# Patient Record
Sex: MALE | Race: WHITE | NOT HISPANIC OR LATINO | Employment: OTHER | URBAN - METROPOLITAN AREA
[De-identification: names, ages, dates, MRNs, and addresses within clinical notes are randomized per-mention and may not be internally consistent; named-entity substitution may affect disease eponyms.]

---

## 2022-01-24 ENCOUNTER — PREP FOR SURGERY (OUTPATIENT)
Dept: OTHER | Facility: HOSPITAL | Age: 72
End: 2022-01-24

## 2022-01-24 ENCOUNTER — CLINICAL SUPPORT (OUTPATIENT)
Dept: GASTROENTEROLOGY | Facility: CLINIC | Age: 72
End: 2022-01-24

## 2022-01-24 DIAGNOSIS — Z12.11 COLON CANCER SCREENING: Primary | ICD-10-CM

## 2022-01-24 RX ORDER — FUROSEMIDE 80 MG
80 TABLET ORAL 2 TIMES DAILY
COMMUNITY

## 2022-01-24 RX ORDER — ATORVASTATIN CALCIUM 40 MG/1
TABLET, FILM COATED ORAL
COMMUNITY
Start: 2021-10-20

## 2022-01-24 RX ORDER — INSULIN ASPART 100 [IU]/ML
25 INJECTION, SUSPENSION SUBCUTANEOUS 2 TIMES DAILY WITH MEALS
COMMUNITY

## 2022-01-24 RX ORDER — AMLODIPINE BESYLATE 10 MG/1
TABLET ORAL
COMMUNITY
Start: 2021-10-20

## 2022-01-24 RX ORDER — LISINOPRIL 40 MG/1
40 TABLET ORAL DAILY
COMMUNITY

## 2022-01-24 RX ORDER — ATENOLOL 50 MG/1
50 TABLET ORAL DAILY
COMMUNITY

## 2022-01-24 RX ORDER — METFORMIN HYDROCHLORIDE 500 MG/1
TABLET, EXTENDED RELEASE ORAL
COMMUNITY
Start: 2021-10-19

## 2022-01-24 RX ORDER — SPIRONOLACTONE 25 MG/1
TABLET ORAL
COMMUNITY
Start: 2021-11-18

## 2022-01-24 RX ORDER — EMPAGLIFLOZIN 25 MG/1
TABLET, FILM COATED ORAL
COMMUNITY
Start: 2021-10-21

## 2022-01-24 RX ORDER — SIMETHICONE 80 MG
TABLET,CHEWABLE ORAL
COMMUNITY
Start: 2021-10-21

## 2022-01-24 RX ORDER — TAMSULOSIN HYDROCHLORIDE 0.4 MG/1
1 CAPSULE ORAL DAILY
COMMUNITY

## 2022-01-24 RX ORDER — SENNOSIDES 8.6 MG
650 CAPSULE ORAL EVERY 8 HOURS PRN
COMMUNITY

## 2022-01-24 NOTE — PROGRESS NOTES
Jack Checocarlos Parrish     REASON FOR CALL-colonoscopy, colon screening, last colon 2016    SENT IN "[facility] [employer] [facility]  DOS-03/28/2022  History reviewed. No pertinent past medical history.  No Known Allergies  Past Surgical History:   Procedure Laterality Date   • COLONOSCOPY  2017   • TONSILLECTOMY       Social History     Socioeconomic History   • Marital status:    Tobacco Use   • Smoking status: Current Every Day Smoker     Types: Cigars   • Smokeless tobacco: Former User   Vaping Use   • Vaping Use: Never used   Substance and Sexual Activity   • Alcohol use: Not Currently   • Drug use: Defer   • Sexual activity: Defer     History reviewed. No pertinent family history.    Current Outpatient Medications:   •  acetaminophen (TYLENOL) 650 MG 8 hr tablet, Take 650 mg by mouth Every 8 (Eight) Hours As Needed for Mild Pain ., Disp: , Rfl:   •  amLODIPine (NORVASC) 10 MG tablet, , Disp: , Rfl:   •  atenolol (TENORMIN) 50 MG tablet, Take 50 mg by mouth Daily., Disp: , Rfl:   •  furosemide (LASIX) 80 MG tablet, Take 80 mg by mouth 2 (Two) Times a Day., Disp: , Rfl:   •  insulin aspart prot-insulin aspart (novoLOG 70/30) (70-30) 100 UNIT/ML injection, Inject 25 Units under the skin into the appropriate area as directed 2 (Two) Times a Day With Meals., Disp: , Rfl:   •  Jardiance 25 MG tablet tablet, , Disp: , Rfl:   •  lisinopril (PRINIVIL,ZESTRIL) 40 MG tablet, Take 40 mg by mouth Daily., Disp: , Rfl:   •  metFORMIN ER (GLUCOPHAGE-XR) 500 MG 24 hr tablet, , Disp: , Rfl:   •  simethicone (MYLICON) 80 MG chewable tablet, , Disp: , Rfl:   •  spironolactone (ALDACTONE) 25 MG tablet, , Disp: , Rfl:   •  tamsulosin (FLOMAX) 0.4 MG capsule 24 hr capsule, Take 1 capsule by mouth Daily., Disp: , Rfl:   •  vitamin E 100 UNIT capsule, Take 100 Units by mouth Daily., Disp: , Rfl:   •  atorvastatin (LIPITOR) 40 MG tablet, , Disp: , Rfl:

## 2022-03-28 ENCOUNTER — HOSPITAL ENCOUNTER (OUTPATIENT)
Facility: HOSPITAL | Age: 72
Setting detail: HOSPITAL OUTPATIENT SURGERY
Discharge: HOME | End: 2022-03-28
Attending: INTERNAL MEDICINE | Admitting: INTERNAL MEDICINE

## 2022-03-28 ENCOUNTER — ANESTHESIA EVENT (OUTPATIENT)
Dept: GASTROENTEROLOGY | Facility: HOSPITAL | Age: 72
End: 2022-03-28

## 2022-03-28 ENCOUNTER — ANESTHESIA (OUTPATIENT)
Dept: GASTROENTEROLOGY | Facility: HOSPITAL | Age: 72
End: 2022-03-28

## 2022-03-28 VITALS
OXYGEN SATURATION: 93 % | WEIGHT: 303.35 LBS | DIASTOLIC BLOOD PRESSURE: 82 MMHG | SYSTOLIC BLOOD PRESSURE: 138 MMHG | HEART RATE: 72 BPM | RESPIRATION RATE: 20 BRPM | TEMPERATURE: 98 F

## 2022-03-28 DIAGNOSIS — Z12.11 COLON CANCER SCREENING: ICD-10-CM

## 2022-03-28 LAB — GLUCOSE BLDC GLUCOMTR-MCNC: 97 MG/DL (ref 70–99)

## 2022-03-28 PROCEDURE — 25010000002 PROPOFOL 10 MG/ML EMULSION: Performed by: NURSE ANESTHETIST, CERTIFIED REGISTERED

## 2022-03-28 PROCEDURE — 88305 TISSUE EXAM BY PATHOLOGIST: CPT | Performed by: INTERNAL MEDICINE

## 2022-03-28 PROCEDURE — 45385 COLONOSCOPY W/LESION REMOVAL: CPT | Performed by: INTERNAL MEDICINE

## 2022-03-28 PROCEDURE — 82962 GLUCOSE BLOOD TEST: CPT

## 2022-03-28 RX ORDER — SODIUM CHLORIDE, SODIUM LACTATE, POTASSIUM CHLORIDE, CALCIUM CHLORIDE 600; 310; 30; 20 MG/100ML; MG/100ML; MG/100ML; MG/100ML
30 INJECTION, SOLUTION INTRAVENOUS CONTINUOUS
Status: DISCONTINUED | OUTPATIENT
Start: 2022-03-28 | End: 2022-03-28 | Stop reason: HOSPADM

## 2022-03-28 RX ORDER — LIDOCAINE HYDROCHLORIDE 20 MG/ML
INJECTION, SOLUTION INFILTRATION; PERINEURAL AS NEEDED
Status: DISCONTINUED | OUTPATIENT
Start: 2022-03-28 | End: 2022-03-28 | Stop reason: SURG

## 2022-03-28 RX ORDER — PROPOFOL 10 MG/ML
VIAL (ML) INTRAVENOUS AS NEEDED
Status: DISCONTINUED | OUTPATIENT
Start: 2022-03-28 | End: 2022-03-28 | Stop reason: SURG

## 2022-03-28 RX ADMIN — SODIUM CHLORIDE, POTASSIUM CHLORIDE, SODIUM LACTATE AND CALCIUM CHLORIDE 30 ML/HR: 600; 310; 30; 20 INJECTION, SOLUTION INTRAVENOUS at 12:32

## 2022-03-28 RX ADMIN — LIDOCAINE HYDROCHLORIDE 100 MG: 20 INJECTION, SOLUTION INFILTRATION; PERINEURAL at 12:58

## 2022-03-28 RX ADMIN — PROPOFOL 50 MG: 10 INJECTION, EMULSION INTRAVENOUS at 12:58

## 2022-03-28 RX ADMIN — PROPOFOL 200 MCG/KG/MIN: 10 INJECTION, EMULSION INTRAVENOUS at 12:58

## 2022-03-28 NOTE — H&P
ScreeningPre Procedure History & Physical    Chief Complaint:   Screening     Subjective     HPI:   Screening     Past Medical History:   No past medical history on file.    Past Surgical History:  Past Surgical History:   Procedure Laterality Date   • COLONOSCOPY  2017   • TONSILLECTOMY         Family History:  No family history on file.    Social History:   reports that he has been smoking cigars. He has quit using smokeless tobacco. He reports previous alcohol use. Drug use questions deferred to the physician.    Medications:   Medications Prior to Admission   Medication Sig Dispense Refill Last Dose   • acetaminophen (TYLENOL) 650 MG 8 hr tablet Take 650 mg by mouth Every 8 (Eight) Hours As Needed for Mild Pain .   Past Week at Unknown time   • atorvastatin (LIPITOR) 40 MG tablet    Past Week at Unknown time   • simethicone (MYLICON) 80 MG chewable tablet    Past Week at Unknown time   • vitamin E 100 UNIT capsule Take 100 Units by mouth Daily.   Past Week at Unknown time   • amLODIPine (NORVASC) 10 MG tablet    3/25/2022   • atenolol (TENORMIN) 50 MG tablet Take 50 mg by mouth Daily.   3/25/2022   • furosemide (LASIX) 80 MG tablet Take 80 mg by mouth 2 (Two) Times a Day.   3/25/2022   • insulin aspart prot-insulin aspart (novoLOG 70/30) (70-30) 100 UNIT/ML injection Inject 25 Units under the skin into the appropriate area as directed 2 (Two) Times a Day With Meals.   3/25/2022   • Jardiance 25 MG tablet tablet    3/25/2022   • lisinopril (PRINIVIL,ZESTRIL) 40 MG tablet Take 40 mg by mouth Daily.   3/25/2022   • metFORMIN ER (GLUCOPHAGE-XR) 500 MG 24 hr tablet    3/25/2022   • spironolactone (ALDACTONE) 25 MG tablet    3/25/2022   • tamsulosin (FLOMAX) 0.4 MG capsule 24 hr capsule Take 1 capsule by mouth Daily.   3/25/2022       Allergies:  Patient has no known allergies.        Objective     Blood pressure 155/75, pulse 77, temperature 98.3 °F (36.8 °C), temperature source Temporal, resp. rate 16, weight (!)  138 kg (303 lb 5.7 oz), SpO2 95 %.    Physical Exam   Constitutional: Pt is oriented to person, place, and time and well-developed, well-nourished, and in no distress.   Mouth/Throat: Oropharynx is clear and moist.   Neck: Normal range of motion.   Cardiovascular: Normal rate, regular rhythm and normal heart sounds.    Pulmonary/Chest: Effort normal and breath sounds normal.   Abdominal: Soft. Nontender  Skin: Skin is warm and dry.   Psychiatric: Mood, memory, affect and judgment normal.     Assessment/Plan     Diagnosis:  Screening colonoscopy       Anticipated Surgical Procedure:  colonoscopy    The risks, benefits, and alternatives of this procedure have been discussed with the patient or the responsible party- the patient understands and agrees to proceed.

## 2022-03-28 NOTE — ANESTHESIA PREPROCEDURE EVALUATION
Anesthesia Evaluation     Patient summary reviewed and Nursing notes reviewed   NPO Solid Status: > 6 hours  NPO Liquid Status: > 6 hours           Airway   Mallampati: II  TM distance: >3 FB  Dental      Pulmonary - negative pulmonary ROS and normal exam   Cardiovascular - negative cardio ROS and normal exam  Exercise tolerance: good (4-7 METS)        Neuro/Psych  GI/Hepatic/Renal/Endo    (+) morbid obesity,      Musculoskeletal     Abdominal    Substance History      OB/GYN          Other                        Anesthesia Plan    ASA 3     general     intravenous induction     Anesthetic plan, all risks, benefits, and alternatives have been provided, discussed and informed consent has been obtained with: patient.        CODE STATUS:

## 2022-03-28 NOTE — ANESTHESIA POSTPROCEDURE EVALUATION
Patient: Avila Parrish    Procedure Summary     Date: 03/28/22 Room / Location: [facility] ENDOSCOPY 2 / [facility] ENDOSCOPY    Anesthesia Start: 1254 Anesthesia Stop: 1324    Procedure: COLONOSCOPY WITH POLYPECTOMIES (N/A ) Diagnosis:       Colon cancer screening      (Colon cancer screening [Z12.11])    Surgeons: Tristan Cooper MD Provider: Danica Terrazas MD    Anesthesia Type: general ASA Status: 3          Anesthesia Type: general    Vitals  Vitals Value Taken Time   /72 03/28/22 1334   Temp 36.7 °C (98 °F) 03/28/22 1323   Pulse 79 03/28/22 1335   Resp 22 03/28/22 1330   SpO2 93 % 03/28/22 1335   Vitals shown include unvalidated device data.        Post Anesthesia Care and Evaluation    Patient location during evaluation: bedside  Patient participation: complete - patient participated  Level of consciousness: awake  Pain score: 0  Pain management: adequate  Airway patency: patent  Anesthetic complications: No anesthetic complications  PONV Status: none  Cardiovascular status: acceptable and stable  Respiratory status: acceptable and room air  Hydration status: acceptable    Comments: An Anesthesiologist personally participated in the most demanding procedures (including induction and emergence if applicable) in the anesthesia plan, monitored the course of anesthesia administration at frequent intervals and remained physically present and available for immediate diagnosis and treatment of emergencies.

## 2022-03-30 ENCOUNTER — TELEPHONE (OUTPATIENT)
Dept: GASTROENTEROLOGY | Facility: CLINIC | Age: 72
End: 2022-03-30

## 2022-03-30 LAB
CYTO UR: NORMAL
LAB AP CASE REPORT: NORMAL
LAB AP CLINICAL INFORMATION: NORMAL
PATH REPORT.FINAL DX SPEC: NORMAL
PATH REPORT.GROSS SPEC: NORMAL

## 2022-03-30 NOTE — TELEPHONE ENCOUNTER
Pt. Placed in recall----- Message from TRAVIS Betancourt sent at 3/30/2022 11:08 AM EDT -----  I have reviewed the patient colonoscopy and pathology report. Patient has tubular adenoma polyps on pathology report that are smaller than 10mm in size. It is recommended the patient be on a 5 year recall. Please place in the recall system.

## 2025-02-12 ENCOUNTER — APPOINTMENT (OUTPATIENT)
Dept: CT IMAGING | Facility: HOSPITAL | Age: 75
DRG: 291 | End: 2025-02-12
Payer: OTHER GOVERNMENT

## 2025-02-12 ENCOUNTER — HOSPITAL ENCOUNTER (INPATIENT)
Facility: HOSPITAL | Age: 75
LOS: 6 days | Discharge: HOME HEALTH | DRG: 291 | End: 2025-02-18
Attending: EMERGENCY MEDICINE | Admitting: STUDENT IN AN ORGANIZED HEALTH CARE EDUCATION/TRAINING PROGRAM
Payer: OTHER GOVERNMENT

## 2025-02-12 ENCOUNTER — APPOINTMENT (OUTPATIENT)
Dept: GENERAL RADIOLOGY | Facility: HOSPITAL | Age: 75
DRG: 291 | End: 2025-02-12
Payer: OTHER GOVERNMENT

## 2025-02-12 DIAGNOSIS — Z78.9 DECREASED ACTIVITIES OF DAILY LIVING (ADL): ICD-10-CM

## 2025-02-12 DIAGNOSIS — J90 PLEURAL EFFUSION: ICD-10-CM

## 2025-02-12 DIAGNOSIS — R26.2 DIFFICULTY IN WALKING: ICD-10-CM

## 2025-02-12 DIAGNOSIS — R51.9 ACUTE INTRACTABLE HEADACHE, UNSPECIFIED HEADACHE TYPE: ICD-10-CM

## 2025-02-12 DIAGNOSIS — I63.9 CEREBROVASCULAR ACCIDENT (CVA), UNSPECIFIED MECHANISM: Primary | ICD-10-CM

## 2025-02-12 DIAGNOSIS — J90 PLEURAL EFFUSION, RIGHT: ICD-10-CM

## 2025-02-12 PROBLEM — E78.5 HYPERLIPIDEMIA: Status: ACTIVE | Noted: 2025-02-12

## 2025-02-12 PROBLEM — E11.65 TYPE 2 DIABETES MELLITUS WITH HYPERGLYCEMIA: Status: ACTIVE | Noted: 2025-02-12

## 2025-02-12 PROBLEM — I50.21 SYSTOLIC CHF, ACUTE: Status: ACTIVE | Noted: 2025-02-12

## 2025-02-12 PROBLEM — I10 ESSENTIAL HYPERTENSION: Status: ACTIVE | Noted: 2025-02-12

## 2025-02-12 PROBLEM — I50.21 SYSTOLIC CHF, ACUTE: Status: RESOLVED | Noted: 2025-02-12 | Resolved: 2025-02-12

## 2025-02-12 LAB
ABO GROUP BLD: NORMAL
ABO GROUP BLD: NORMAL
ALBUMIN SERPL-MCNC: 3.5 G/DL (ref 3.5–5.2)
ALBUMIN/GLOB SERPL: 1.1 G/DL
ALP SERPL-CCNC: 66 U/L (ref 39–117)
ALT SERPL W P-5'-P-CCNC: 6 U/L (ref 1–41)
ANION GAP SERPL CALCULATED.3IONS-SCNC: 13.1 MMOL/L (ref 5–15)
APTT PPP: 34.1 SECONDS (ref 24.2–34.2)
AST SERPL-CCNC: 16 U/L (ref 1–40)
BASOPHILS # BLD AUTO: 0.02 10*3/MM3 (ref 0–0.2)
BASOPHILS NFR BLD AUTO: 0.3 % (ref 0–1.5)
BILIRUB SERPL-MCNC: 0.7 MG/DL (ref 0–1.2)
BLD GP AB SCN SERPL QL: NEGATIVE
BUN SERPL-MCNC: 15 MG/DL (ref 8–23)
BUN/CREAT SERPL: 7.7 (ref 7–25)
CALCIUM SPEC-SCNC: 9.5 MG/DL (ref 8.6–10.5)
CHLORIDE SERPL-SCNC: 102 MMOL/L (ref 98–107)
CO2 SERPL-SCNC: 22.9 MMOL/L (ref 22–29)
CREAT SERPL-MCNC: 1.95 MG/DL (ref 0.76–1.27)
DEPRECATED RDW RBC AUTO: 48.7 FL (ref 37–54)
EGFRCR SERPLBLD CKD-EPI 2021: 35 ML/MIN/1.73
EOSINOPHIL # BLD AUTO: 0 10*3/MM3 (ref 0–0.4)
EOSINOPHIL NFR BLD AUTO: 0 % (ref 0.3–6.2)
ERYTHROCYTE [DISTWIDTH] IN BLOOD BY AUTOMATED COUNT: 15.5 % (ref 12.3–15.4)
GLOBULIN UR ELPH-MCNC: 3.1 GM/DL
GLUCOSE SERPL-MCNC: 150 MG/DL (ref 65–99)
HCT VFR BLD AUTO: 45.3 % (ref 37.5–51)
HGB BLD-MCNC: 14.8 G/DL (ref 13–17.7)
HOLD SPECIMEN: NORMAL
HOLD SPECIMEN: NORMAL
IMM GRANULOCYTES # BLD AUTO: 0.06 10*3/MM3 (ref 0–0.05)
IMM GRANULOCYTES NFR BLD AUTO: 0.9 % (ref 0–0.5)
INR PPP: 1.27 (ref 0.86–1.15)
LYMPHOCYTES # BLD AUTO: 0.71 10*3/MM3 (ref 0.7–3.1)
LYMPHOCYTES NFR BLD AUTO: 10.4 % (ref 19.6–45.3)
MCH RBC QN AUTO: 28.4 PG (ref 26.6–33)
MCHC RBC AUTO-ENTMCNC: 32.7 G/DL (ref 31.5–35.7)
MCV RBC AUTO: 86.8 FL (ref 79–97)
MONOCYTES # BLD AUTO: 0.63 10*3/MM3 (ref 0.1–0.9)
MONOCYTES NFR BLD AUTO: 9.2 % (ref 5–12)
NEUTROPHILS NFR BLD AUTO: 5.42 10*3/MM3 (ref 1.7–7)
NEUTROPHILS NFR BLD AUTO: 79.2 % (ref 42.7–76)
NRBC BLD AUTO-RTO: 0 /100 WBC (ref 0–0.2)
PLATELET # BLD AUTO: 130 10*3/MM3 (ref 140–450)
PMV BLD AUTO: 11.7 FL (ref 6–12)
POTASSIUM SERPL-SCNC: 4 MMOL/L (ref 3.5–5.2)
PROT SERPL-MCNC: 6.6 G/DL (ref 6–8.5)
PROTHROMBIN TIME: 16.4 SECONDS (ref 11.8–14.9)
RBC # BLD AUTO: 5.22 10*6/MM3 (ref 4.14–5.8)
RH BLD: NEGATIVE
RH BLD: NEGATIVE
SODIUM SERPL-SCNC: 138 MMOL/L (ref 136–145)
T&S EXPIRATION DATE: NORMAL
WBC NRBC COR # BLD AUTO: 6.84 10*3/MM3 (ref 3.4–10.8)
WHOLE BLOOD HOLD COAG: NORMAL
WHOLE BLOOD HOLD SPECIMEN: NORMAL

## 2025-02-12 PROCEDURE — 82948 REAGENT STRIP/BLOOD GLUCOSE: CPT

## 2025-02-12 PROCEDURE — 99222 1ST HOSP IP/OBS MODERATE 55: CPT | Performed by: PSYCHIATRY & NEUROLOGY

## 2025-02-12 PROCEDURE — 85730 THROMBOPLASTIN TIME PARTIAL: CPT | Performed by: EMERGENCY MEDICINE

## 2025-02-12 PROCEDURE — 99223 1ST HOSP IP/OBS HIGH 75: CPT | Performed by: STUDENT IN AN ORGANIZED HEALTH CARE EDUCATION/TRAINING PROGRAM

## 2025-02-12 PROCEDURE — 99285 EMERGENCY DEPT VISIT HI MDM: CPT

## 2025-02-12 PROCEDURE — 86900 BLOOD TYPING SEROLOGIC ABO: CPT

## 2025-02-12 PROCEDURE — 86850 RBC ANTIBODY SCREEN: CPT | Performed by: EMERGENCY MEDICINE

## 2025-02-12 PROCEDURE — 71045 X-RAY EXAM CHEST 1 VIEW: CPT

## 2025-02-12 PROCEDURE — 36415 COLL VENOUS BLD VENIPUNCTURE: CPT

## 2025-02-12 PROCEDURE — 93010 ELECTROCARDIOGRAM REPORT: CPT | Performed by: INTERNAL MEDICINE

## 2025-02-12 PROCEDURE — 25510000001 IOPAMIDOL PER 1 ML: Performed by: EMERGENCY MEDICINE

## 2025-02-12 PROCEDURE — 25010000002 DIPHENHYDRAMINE PER 50 MG: Performed by: EMERGENCY MEDICINE

## 2025-02-12 PROCEDURE — 0042T HC CT CEREBRAL PERFUSION W/WO CONTRAST: CPT

## 2025-02-12 PROCEDURE — 70496 CT ANGIOGRAPHY HEAD: CPT

## 2025-02-12 PROCEDURE — 85025 COMPLETE CBC W/AUTO DIFF WBC: CPT | Performed by: EMERGENCY MEDICINE

## 2025-02-12 PROCEDURE — 36415 COLL VENOUS BLD VENIPUNCTURE: CPT | Performed by: EMERGENCY MEDICINE

## 2025-02-12 PROCEDURE — 70498 CT ANGIOGRAPHY NECK: CPT

## 2025-02-12 PROCEDURE — 80053 COMPREHEN METABOLIC PANEL: CPT | Performed by: EMERGENCY MEDICINE

## 2025-02-12 PROCEDURE — 25010000002 METOCLOPRAMIDE PER 10 MG: Performed by: EMERGENCY MEDICINE

## 2025-02-12 PROCEDURE — 86901 BLOOD TYPING SEROLOGIC RH(D): CPT | Performed by: EMERGENCY MEDICINE

## 2025-02-12 PROCEDURE — 86901 BLOOD TYPING SEROLOGIC RH(D): CPT

## 2025-02-12 PROCEDURE — 93005 ELECTROCARDIOGRAM TRACING: CPT | Performed by: EMERGENCY MEDICINE

## 2025-02-12 PROCEDURE — 25010000002 KETOROLAC TROMETHAMINE PER 15 MG: Performed by: EMERGENCY MEDICINE

## 2025-02-12 PROCEDURE — 70450 CT HEAD/BRAIN W/O DYE: CPT

## 2025-02-12 PROCEDURE — 85610 PROTHROMBIN TIME: CPT | Performed by: EMERGENCY MEDICINE

## 2025-02-12 PROCEDURE — 86900 BLOOD TYPING SEROLOGIC ABO: CPT | Performed by: EMERGENCY MEDICINE

## 2025-02-12 RX ORDER — BUMETANIDE 1 MG/1
1 TABLET ORAL 2 TIMES DAILY
COMMUNITY

## 2025-02-12 RX ORDER — IOPAMIDOL 755 MG/ML
100 INJECTION, SOLUTION INTRAVASCULAR
Status: COMPLETED | OUTPATIENT
Start: 2025-02-12 | End: 2025-02-12

## 2025-02-12 RX ORDER — TROSPIUM CHLORIDE 20 MG/1
20 TABLET, FILM COATED ORAL 2 TIMES DAILY
COMMUNITY

## 2025-02-12 RX ORDER — BACLOFEN 10 MG/1
5 TABLET ORAL NIGHTLY
COMMUNITY
End: 2025-02-18 | Stop reason: HOSPADM

## 2025-02-12 RX ORDER — METOPROLOL TARTRATE 100 MG/1
100 TABLET ORAL 2 TIMES DAILY
COMMUNITY
End: 2025-02-18 | Stop reason: HOSPADM

## 2025-02-12 RX ORDER — SODIUM CHLORIDE 0.9 % (FLUSH) 0.9 %
10 SYRINGE (ML) INJECTION AS NEEDED
Status: DISCONTINUED | OUTPATIENT
Start: 2025-02-12 | End: 2025-02-18 | Stop reason: HOSPADM

## 2025-02-12 RX ORDER — FINASTERIDE 5 MG/1
5 TABLET, FILM COATED ORAL DAILY
COMMUNITY

## 2025-02-12 RX ORDER — KETOROLAC TROMETHAMINE 15 MG/ML
15 INJECTION, SOLUTION INTRAMUSCULAR; INTRAVENOUS ONCE
Status: COMPLETED | OUTPATIENT
Start: 2025-02-12 | End: 2025-02-12

## 2025-02-12 RX ORDER — HYDRALAZINE HYDROCHLORIDE 25 MG/1
25 TABLET, FILM COATED ORAL 3 TIMES DAILY
COMMUNITY
End: 2025-02-18 | Stop reason: HOSPADM

## 2025-02-12 RX ORDER — IOPAMIDOL 755 MG/ML
100 INJECTION, SOLUTION INTRAVASCULAR
Status: DISCONTINUED | OUTPATIENT
Start: 2025-02-12 | End: 2025-02-18 | Stop reason: HOSPADM

## 2025-02-12 RX ORDER — MUPIROCIN 20 MG/G
1 OINTMENT TOPICAL 3 TIMES DAILY
COMMUNITY

## 2025-02-12 RX ORDER — POTASSIUM CHLORIDE 750 MG/1
10 TABLET, EXTENDED RELEASE ORAL DAILY
COMMUNITY

## 2025-02-12 RX ORDER — DIPHENHYDRAMINE HYDROCHLORIDE 50 MG/ML
25 INJECTION INTRAMUSCULAR; INTRAVENOUS ONCE
Status: COMPLETED | OUTPATIENT
Start: 2025-02-12 | End: 2025-02-12

## 2025-02-12 RX ORDER — LATANOPROST 50 UG/ML
1 SOLUTION/ DROPS OPHTHALMIC NIGHTLY
COMMUNITY

## 2025-02-12 RX ORDER — CHOLECALCIFEROL (VITAMIN D3) 25 MCG
1000 TABLET ORAL DAILY
COMMUNITY

## 2025-02-12 RX ORDER — METOCLOPRAMIDE HYDROCHLORIDE 5 MG/ML
10 INJECTION INTRAMUSCULAR; INTRAVENOUS ONCE
Status: COMPLETED | OUTPATIENT
Start: 2025-02-12 | End: 2025-02-12

## 2025-02-12 RX ADMIN — DIPHENHYDRAMINE HYDROCHLORIDE 25 MG: 50 INJECTION, SOLUTION INTRAMUSCULAR; INTRAVENOUS at 20:41

## 2025-02-12 RX ADMIN — IOPAMIDOL 40 ML: 755 INJECTION, SOLUTION INTRAVENOUS at 19:56

## 2025-02-12 RX ADMIN — KETOROLAC TROMETHAMINE 15 MG: 15 INJECTION, SOLUTION INTRAMUSCULAR; INTRAVENOUS at 20:40

## 2025-02-12 RX ADMIN — IOPAMIDOL 100 ML: 755 INJECTION, SOLUTION INTRAVENOUS at 20:02

## 2025-02-12 RX ADMIN — METOCLOPRAMIDE 10 MG: 5 INJECTION, SOLUTION INTRAMUSCULAR; INTRAVENOUS at 20:41

## 2025-02-13 ENCOUNTER — APPOINTMENT (OUTPATIENT)
Dept: CARDIOLOGY | Facility: HOSPITAL | Age: 75
DRG: 291 | End: 2025-02-13
Payer: OTHER GOVERNMENT

## 2025-02-13 ENCOUNTER — APPOINTMENT (OUTPATIENT)
Dept: MRI IMAGING | Facility: HOSPITAL | Age: 75
DRG: 291 | End: 2025-02-13
Payer: OTHER GOVERNMENT

## 2025-02-13 LAB
AV MEAN PRESS GRAD SYS DOP V1V2: 13 MMHG
AV VMAX SYS DOP: 219 CM/SEC
BACTERIA UR QL AUTO: ABNORMAL /HPF
BH CV ECHO MEAS - AO MAX PG: 19.2 MMHG
BH CV ECHO MEAS - AO ROOT DIAM: 2.2 CM
BH CV ECHO MEAS - AO V2 VTI: 37.6 CM
BH CV ECHO MEAS - AVA(I,D): 1.46 CM2
BH CV ECHO MEAS - EDV(CUBED): 227 ML
BH CV ECHO MEAS - EDV(MOD-SP2): 65.3 ML
BH CV ECHO MEAS - EDV(MOD-SP4): 78.1 ML
BH CV ECHO MEAS - EF(MOD-SP2): 51.8 %
BH CV ECHO MEAS - EF(MOD-SP4): 51.3 %
BH CV ECHO MEAS - ESV(CUBED): 91.1 ML
BH CV ECHO MEAS - ESV(MOD-SP2): 31.5 ML
BH CV ECHO MEAS - ESV(MOD-SP4): 38 ML
BH CV ECHO MEAS - FS: 26.2 %
BH CV ECHO MEAS - IVS/LVPW: 1 CM
BH CV ECHO MEAS - IVSD: 1.2 CM
BH CV ECHO MEAS - LA DIMENSION: 4.8 CM
BH CV ECHO MEAS - LAT PEAK E' VEL: 6.1 CM/SEC
BH CV ECHO MEAS - LV MASS(C)D: 322.7 GRAMS
BH CV ECHO MEAS - LV MAX PG: 5.9 MMHG
BH CV ECHO MEAS - LV MEAN PG: 3 MMHG
BH CV ECHO MEAS - LV V1 MAX: 121 CM/SEC
BH CV ECHO MEAS - LV V1 VTI: 19.3 CM
BH CV ECHO MEAS - LVIDD: 6.1 CM
BH CV ECHO MEAS - LVIDS: 4.5 CM
BH CV ECHO MEAS - LVOT AREA: 2.8 CM2
BH CV ECHO MEAS - LVOT DIAM: 1.9 CM
BH CV ECHO MEAS - LVPWD: 1.2 CM
BH CV ECHO MEAS - MED PEAK E' VEL: 5.3 CM/SEC
BH CV ECHO MEAS - MV DEC SLOPE: 673.8 CM/SEC2
BH CV ECHO MEAS - MV DEC TIME: 0.23 SEC
BH CV ECHO MEAS - MV E MAX VEL: 154 CM/SEC
BH CV ECHO MEAS - MV MAX PG: 9.5 MMHG
BH CV ECHO MEAS - MV MEAN PG: 5 MMHG
BH CV ECHO MEAS - MV P1/2T: 68.7 MSEC
BH CV ECHO MEAS - MV V2 VTI: 30.8 CM
BH CV ECHO MEAS - MVA(P1/2T): 3.2 CM2
BH CV ECHO MEAS - MVA(VTI): 1.78 CM2
BH CV ECHO MEAS - PA V2 MAX: 85.8 CM/SEC
BH CV ECHO MEAS - SV(LVOT): 54.7 ML
BH CV ECHO MEAS - SV(MOD-SP2): 33.8 ML
BH CV ECHO MEAS - SV(MOD-SP4): 40.1 ML
BH CV ECHO MEAS - TAPSE (>1.6): 1.75 CM
BH CV ECHO MEASUREMENTS AVERAGE E/E' RATIO: 27.02
BH CV XLRA - RV BASE: 3.8 CM
BH CV XLRA - RV MID: 3.1 CM
BH CV XLRA - TDI S': 11 CM/SEC
BILIRUB UR QL STRIP: NEGATIVE
CHOLEST SERPL-MCNC: 172 MG/DL (ref 0–200)
CLARITY UR: CLEAR
COLOR UR: YELLOW
GLUCOSE BLDC GLUCOMTR-MCNC: 130 MG/DL (ref 70–99)
GLUCOSE BLDC GLUCOMTR-MCNC: 164 MG/DL (ref 70–99)
GLUCOSE BLDC GLUCOMTR-MCNC: 180 MG/DL (ref 70–99)
GLUCOSE BLDC GLUCOMTR-MCNC: 188 MG/DL (ref 70–99)
GLUCOSE BLDC GLUCOMTR-MCNC: 194 MG/DL (ref 70–99)
GLUCOSE BLDC GLUCOMTR-MCNC: 207 MG/DL (ref 70–99)
GLUCOSE UR STRIP-MCNC: ABNORMAL MG/DL
HBA1C MFR BLD: 6.9 % (ref 4.8–5.6)
HDLC SERPL-MCNC: 35 MG/DL (ref 40–60)
HGB UR QL STRIP.AUTO: ABNORMAL
HYALINE CASTS UR QL AUTO: ABNORMAL /LPF
KETONES UR QL STRIP: ABNORMAL
LDLC SERPL CALC-MCNC: 106 MG/DL (ref 0–100)
LDLC/HDLC SERPL: 2.89 {RATIO}
LEFT ATRIUM VOLUME INDEX: 23.3 ML/M2
LEUKOCYTE ESTERASE UR QL STRIP.AUTO: NEGATIVE
LV EF BIPLANE MOD: 51 %
NITRITE UR QL STRIP: NEGATIVE
PH UR STRIP.AUTO: 6 [PH] (ref 5–8)
PROT UR QL STRIP: ABNORMAL
RBC # UR STRIP: ABNORMAL /HPF
REF LAB TEST METHOD: ABNORMAL
SP GR UR STRIP: >1.03 (ref 1–1.03)
SQUAMOUS #/AREA URNS HPF: ABNORMAL /HPF
TRIGL SERPL-MCNC: 179 MG/DL (ref 0–150)
UROBILINOGEN UR QL STRIP: ABNORMAL
VLDLC SERPL-MCNC: 31 MG/DL (ref 5–40)
WBC # UR STRIP: ABNORMAL /HPF
WHOLE BLOOD HOLD SPECIMEN: NORMAL

## 2025-02-13 PROCEDURE — 97166 OT EVAL MOD COMPLEX 45 MIN: CPT

## 2025-02-13 PROCEDURE — 93306 TTE W/DOPPLER COMPLETE: CPT

## 2025-02-13 PROCEDURE — 63710000001 INSULIN LISPRO (HUMAN) PER 5 UNITS: Performed by: PHYSICIAN ASSISTANT

## 2025-02-13 PROCEDURE — 92610 EVALUATE SWALLOWING FUNCTION: CPT

## 2025-02-13 PROCEDURE — 99233 SBSQ HOSP IP/OBS HIGH 50: CPT | Performed by: STUDENT IN AN ORGANIZED HEALTH CARE EDUCATION/TRAINING PROGRAM

## 2025-02-13 PROCEDURE — 93306 TTE W/DOPPLER COMPLETE: CPT | Performed by: INTERNAL MEDICINE

## 2025-02-13 PROCEDURE — 70551 MRI BRAIN STEM W/O DYE: CPT

## 2025-02-13 PROCEDURE — 83036 HEMOGLOBIN GLYCOSYLATED A1C: CPT | Performed by: STUDENT IN AN ORGANIZED HEALTH CARE EDUCATION/TRAINING PROGRAM

## 2025-02-13 PROCEDURE — 25010000002 BUMETANIDE PER 0.5 MG: Performed by: STUDENT IN AN ORGANIZED HEALTH CARE EDUCATION/TRAINING PROGRAM

## 2025-02-13 PROCEDURE — 82948 REAGENT STRIP/BLOOD GLUCOSE: CPT

## 2025-02-13 PROCEDURE — 99232 SBSQ HOSP IP/OBS MODERATE 35: CPT | Performed by: STUDENT IN AN ORGANIZED HEALTH CARE EDUCATION/TRAINING PROGRAM

## 2025-02-13 PROCEDURE — 25010000002 SULFUR HEXAFLUORIDE MICROSPH 60.7-25 MG RECONSTITUTED SUSPENSION: Performed by: STUDENT IN AN ORGANIZED HEALTH CARE EDUCATION/TRAINING PROGRAM

## 2025-02-13 PROCEDURE — 80061 LIPID PANEL: CPT | Performed by: STUDENT IN AN ORGANIZED HEALTH CARE EDUCATION/TRAINING PROGRAM

## 2025-02-13 PROCEDURE — 25010000002 FUROSEMIDE PER 20 MG: Performed by: STUDENT IN AN ORGANIZED HEALTH CARE EDUCATION/TRAINING PROGRAM

## 2025-02-13 PROCEDURE — 94799 UNLISTED PULMONARY SVC/PX: CPT

## 2025-02-13 PROCEDURE — 97161 PT EVAL LOW COMPLEX 20 MIN: CPT

## 2025-02-13 PROCEDURE — 81001 URINALYSIS AUTO W/SCOPE: CPT | Performed by: EMERGENCY MEDICINE

## 2025-02-13 PROCEDURE — 63710000001 INSULIN GLARGINE PER 5 UNITS: Performed by: STUDENT IN AN ORGANIZED HEALTH CARE EDUCATION/TRAINING PROGRAM

## 2025-02-13 RX ORDER — MINERAL OIL/HYDROPHIL PETROLAT
1 OINTMENT (GRAM) TOPICAL
Status: DISCONTINUED | OUTPATIENT
Start: 2025-02-13 | End: 2025-02-18 | Stop reason: HOSPADM

## 2025-02-13 RX ORDER — BUMETANIDE 0.25 MG/ML
2.5 INJECTION, SOLUTION INTRAMUSCULAR; INTRAVENOUS
Status: DISCONTINUED | OUTPATIENT
Start: 2025-02-13 | End: 2025-02-14

## 2025-02-13 RX ORDER — HYDRALAZINE HYDROCHLORIDE 25 MG/1
25 TABLET, FILM COATED ORAL 3 TIMES DAILY
Status: DISCONTINUED | OUTPATIENT
Start: 2025-02-13 | End: 2025-02-18 | Stop reason: HOSPADM

## 2025-02-13 RX ORDER — ACETAMINOPHEN 325 MG/1
650 TABLET ORAL EVERY 4 HOURS PRN
Status: DISCONTINUED | OUTPATIENT
Start: 2025-02-13 | End: 2025-02-15

## 2025-02-13 RX ORDER — BUMETANIDE 1 MG/1
1 TABLET ORAL 2 TIMES DAILY
Status: DISCONTINUED | OUTPATIENT
Start: 2025-02-13 | End: 2025-02-13

## 2025-02-13 RX ORDER — IBUPROFEN 600 MG/1
1 TABLET ORAL
Status: DISCONTINUED | OUTPATIENT
Start: 2025-02-13 | End: 2025-02-18 | Stop reason: HOSPADM

## 2025-02-13 RX ORDER — ASPIRIN 81 MG/1
81 TABLET, CHEWABLE ORAL DAILY
Status: DISCONTINUED | OUTPATIENT
Start: 2025-02-13 | End: 2025-02-18 | Stop reason: HOSPADM

## 2025-02-13 RX ORDER — INSULIN LISPRO 100 [IU]/ML
2-9 INJECTION, SOLUTION INTRAVENOUS; SUBCUTANEOUS
Status: DISCONTINUED | OUTPATIENT
Start: 2025-02-13 | End: 2025-02-18 | Stop reason: HOSPADM

## 2025-02-13 RX ORDER — SODIUM CHLORIDE 0.9 % (FLUSH) 0.9 %
10 SYRINGE (ML) INJECTION AS NEEDED
Status: DISCONTINUED | OUTPATIENT
Start: 2025-02-13 | End: 2025-02-18 | Stop reason: HOSPADM

## 2025-02-13 RX ORDER — METOPROLOL TARTRATE 50 MG
100 TABLET ORAL 2 TIMES DAILY
Status: DISCONTINUED | OUTPATIENT
Start: 2025-02-13 | End: 2025-02-17

## 2025-02-13 RX ORDER — SODIUM CHLORIDE 9 MG/ML
40 INJECTION, SOLUTION INTRAVENOUS AS NEEDED
Status: DISCONTINUED | OUTPATIENT
Start: 2025-02-13 | End: 2025-02-18 | Stop reason: HOSPADM

## 2025-02-13 RX ORDER — BISACODYL 10 MG
10 SUPPOSITORY, RECTAL RECTAL DAILY PRN
Status: DISCONTINUED | OUTPATIENT
Start: 2025-02-13 | End: 2025-02-18 | Stop reason: HOSPADM

## 2025-02-13 RX ORDER — TAMSULOSIN HYDROCHLORIDE 0.4 MG/1
0.8 CAPSULE ORAL NIGHTLY
Status: DISCONTINUED | OUTPATIENT
Start: 2025-02-13 | End: 2025-02-18 | Stop reason: HOSPADM

## 2025-02-13 RX ORDER — ONDANSETRON 2 MG/ML
4 INJECTION INTRAMUSCULAR; INTRAVENOUS EVERY 6 HOURS PRN
Status: DISCONTINUED | OUTPATIENT
Start: 2025-02-13 | End: 2025-02-18 | Stop reason: HOSPADM

## 2025-02-13 RX ORDER — SODIUM CHLORIDE 0.9 % (FLUSH) 0.9 %
10 SYRINGE (ML) INJECTION EVERY 12 HOURS SCHEDULED
Status: DISCONTINUED | OUTPATIENT
Start: 2025-02-13 | End: 2025-02-18 | Stop reason: HOSPADM

## 2025-02-13 RX ORDER — DOCUSATE SODIUM 100 MG/1
100 CAPSULE, LIQUID FILLED ORAL 2 TIMES DAILY PRN
Status: DISCONTINUED | OUTPATIENT
Start: 2025-02-13 | End: 2025-02-18 | Stop reason: HOSPADM

## 2025-02-13 RX ORDER — FUROSEMIDE 10 MG/ML
80 INJECTION INTRAMUSCULAR; INTRAVENOUS EVERY 12 HOURS
Status: DISCONTINUED | OUTPATIENT
Start: 2025-02-13 | End: 2025-02-13

## 2025-02-13 RX ORDER — ALUMINA, MAGNESIA, AND SIMETHICONE 2400; 2400; 240 MG/30ML; MG/30ML; MG/30ML
7.5 SUSPENSION ORAL EVERY 4 HOURS PRN
Status: DISCONTINUED | OUTPATIENT
Start: 2025-02-13 | End: 2025-02-18 | Stop reason: HOSPADM

## 2025-02-13 RX ORDER — ACETAMINOPHEN 650 MG/1
650 SUPPOSITORY RECTAL EVERY 4 HOURS PRN
Status: DISCONTINUED | OUTPATIENT
Start: 2025-02-13 | End: 2025-02-15

## 2025-02-13 RX ORDER — BUMETANIDE 1 MG/1
2 TABLET ORAL
Status: DISCONTINUED | OUTPATIENT
Start: 2025-02-13 | End: 2025-02-13

## 2025-02-13 RX ORDER — ATORVASTATIN CALCIUM 40 MG/1
80 TABLET, FILM COATED ORAL NIGHTLY
Status: DISCONTINUED | OUTPATIENT
Start: 2025-02-13 | End: 2025-02-18 | Stop reason: HOSPADM

## 2025-02-13 RX ORDER — FINASTERIDE 5 MG/1
5 TABLET, FILM COATED ORAL DAILY
Status: DISCONTINUED | OUTPATIENT
Start: 2025-02-13 | End: 2025-02-18 | Stop reason: HOSPADM

## 2025-02-13 RX ORDER — ASPIRIN 300 MG/1
300 SUPPOSITORY RECTAL DAILY
Status: DISCONTINUED | OUTPATIENT
Start: 2025-02-13 | End: 2025-02-18 | Stop reason: HOSPADM

## 2025-02-13 RX ORDER — BUMETANIDE 0.25 MG/ML
2 INJECTION, SOLUTION INTRAMUSCULAR; INTRAVENOUS
Status: DISCONTINUED | OUTPATIENT
Start: 2025-02-13 | End: 2025-02-13

## 2025-02-13 RX ORDER — LISINOPRIL 20 MG/1
40 TABLET ORAL DAILY
Status: DISCONTINUED | OUTPATIENT
Start: 2025-02-13 | End: 2025-02-18

## 2025-02-13 RX ORDER — DEXTROSE MONOHYDRATE 25 G/50ML
25 INJECTION, SOLUTION INTRAVENOUS
Status: DISCONTINUED | OUTPATIENT
Start: 2025-02-13 | End: 2025-02-18 | Stop reason: HOSPADM

## 2025-02-13 RX ORDER — NICOTINE POLACRILEX 4 MG
15 LOZENGE BUCCAL
Status: DISCONTINUED | OUTPATIENT
Start: 2025-02-13 | End: 2025-02-18 | Stop reason: HOSPADM

## 2025-02-13 RX ADMIN — INSULIN GLARGINE 10 UNITS: 100 INJECTION, SOLUTION SUBCUTANEOUS at 21:29

## 2025-02-13 RX ADMIN — HYDRALAZINE HYDROCHLORIDE 25 MG: 25 TABLET ORAL at 21:32

## 2025-02-13 RX ADMIN — ATORVASTATIN CALCIUM 80 MG: 40 TABLET, FILM COATED ORAL at 02:49

## 2025-02-13 RX ADMIN — TAMSULOSIN HYDROCHLORIDE 0.8 MG: 0.4 CAPSULE ORAL at 21:28

## 2025-02-13 RX ADMIN — INSULIN LISPRO 4 UNITS: 100 INJECTION, SOLUTION INTRAVENOUS; SUBCUTANEOUS at 21:30

## 2025-02-13 RX ADMIN — INSULIN LISPRO 2 UNITS: 100 INJECTION, SOLUTION INTRAVENOUS; SUBCUTANEOUS at 13:03

## 2025-02-13 RX ADMIN — WHITE PETROLATUM 1 APPLICATION: 1.75 OINTMENT TOPICAL at 11:48

## 2025-02-13 RX ADMIN — Medication 10 ML: at 08:19

## 2025-02-13 RX ADMIN — HYDRALAZINE HYDROCHLORIDE 25 MG: 25 TABLET ORAL at 16:14

## 2025-02-13 RX ADMIN — FUROSEMIDE 80 MG: 10 INJECTION, SOLUTION INTRAMUSCULAR; INTRAVENOUS at 08:18

## 2025-02-13 RX ADMIN — LISINOPRIL 40 MG: 20 TABLET ORAL at 16:14

## 2025-02-13 RX ADMIN — INSULIN LISPRO 2 UNITS: 100 INJECTION, SOLUTION INTRAVENOUS; SUBCUTANEOUS at 18:03

## 2025-02-13 RX ADMIN — Medication 10 ML: at 02:50

## 2025-02-13 RX ADMIN — ASPIRIN 81 MG: 81 TABLET, CHEWABLE ORAL at 08:18

## 2025-02-13 RX ADMIN — FINASTERIDE 5 MG: 5 TABLET, FILM COATED ORAL at 16:14

## 2025-02-13 RX ADMIN — Medication 10 ML: at 21:32

## 2025-02-13 RX ADMIN — INSULIN LISPRO 2 UNITS: 100 INJECTION, SOLUTION INTRAVENOUS; SUBCUTANEOUS at 09:11

## 2025-02-13 RX ADMIN — BUMETANIDE 2.5 MG: 0.25 INJECTION INTRAMUSCULAR; INTRAVENOUS at 18:03

## 2025-02-13 RX ADMIN — SULFUR HEXAFLUORIDE 2 ML: KIT at 11:24

## 2025-02-13 RX ADMIN — APIXABAN 5 MG: 5 TABLET, FILM COATED ORAL at 21:28

## 2025-02-13 RX ADMIN — METOPROLOL TARTRATE 100 MG: 50 TABLET, FILM COATED ORAL at 21:32

## 2025-02-13 RX ADMIN — ATORVASTATIN CALCIUM 80 MG: 40 TABLET, FILM COATED ORAL at 22:14

## 2025-02-13 NOTE — CASE MANAGEMENT/SOCIAL WORK
Discharge Planning Assessment  TIKA You     Patient Name: Avila Parrish  MRN: [id number]  Today's Date: 2/13/2025    Admit Date: 2/12/2025    Plan: Pt lives home alone. Pt states his daughter Briseida helps as needed. Pt usually indpendent at home. PCP: [facility]. Pt denies fin. stressors or concerns with transportation. Pt usually drives himself. Pt states he is current with [facility] and he believes it is [facility]. SW following up with [facility]. Pt plans to return home and continue HHC, Pt may be open to InPt rehab if needed. SW will continue to follow for needs.   Discharge Needs Assessment       Row Name 02/13/25 1145       Living Environment    People in Home alone    Current Living Arrangements home    Potentially Unsafe Housing Conditions none    In the past 12 months has the electric, gas, oil, or water company threatened to shut off services in your home? No    Primary Care Provided by self    Provides Primary Care For no one    Family Caregiver if Needed child(mando), adult    Family Caregiver Names Briseida Brandt    Quality of Family Relationships helpful;involved;supportive    Able to Return to Prior Arrangements yes       Resource/Environmental Concerns    Resource/Environmental Concerns none    Transportation Concerns none       Transportation Needs    In the past 12 months, has lack of transportation kept you from medical appointments or from getting medications? no    In the past 12 months, has lack of transportation kept you from meetings, work, or from getting things needed for daily living? No       Food Insecurity    Within the past 12 months, you worried that your food would run out before you got the money to buy more. Never true    Within the past 12 months, the food you bought just didn't last and you didn't have money to get more. Never true       Transition Planning    Patient/Family Anticipated Services at [facility]     Transportation Anticipated car, drives self       Discharge Needs Assessment    Readmission Within the Last 30 Days no previous admission in last 30 days    Equipment Currently Used at Home cpap;wheelchair;walker, rolling;commode;shower chair;cane, straight    Concerns to be Addressed discharge planning    Do you want help finding or keeping work or a job? I do not need or want help    Do you want help with school or training? For example, starting or completing job training or getting a high school diploma, GED or equivalent No    Anticipated Changes Related to Illness none    Equipment Needed After Discharge none    Discharge Coordination/Progress Pt lives home alone. Pt states his daughter Briseida helps as needed. Pt usually indpendent at home. PCP: [facility]. Pt denies fin. stressors or concerns with transportation. Pt usually drives himself. Pt states he is current with [facility] and he believes it is [facility]. [facility]. Pt plans to return home and continue [facility], Pt may be open to InPt rehab if needed. SW will continue to follow for needs.                   Discharge Plan       Row Name 02/13/25 1152       Plan    Plan Pt lives home alone. Pt states his daughter Briseida helps as needed. Pt usually indpendent at home. PCP: [facility]. Pt denies fin. stressors or concerns with transportation. Pt usually drives himself. Pt states he is current with [facility] and he believes it is [facility]. SW following up with [facility]. Pt plans to return home and continue [facility], Pt may be open to InPt rehab if needed. SW will continue to follow for needs.                  Continued Care and Services - Admitted Since 2/12/2025    No active coordination exists for this encounter.          Demographic Summary       Row Name 02/13/25 1140       General Information    Admission Type inpatient    Arrived From emergency department     Referral Source admission list    Reason for Consult discharge planning    Preferred Language English       Contact Information    Permission Granted to Share Info With permission denied                   Functional Status       Row Name 02/13/25 1145       Functional Status    Usual Activity Tolerance good    Current Activity Tolerance good       Physical Activity    On average, how many days per week do you engage in moderate to strenuous exercise (like a brisk walk)? 0 days    On average, how many minutes do you engage in exercise at this level? 0 min    Number of minutes of exercise per week 0       Assessment of Health Literacy    How often do you have someone help you read hospital materials? Never    How often do you have problems learning about your medical condition because of difficulty understanding written information? Never    How often do you have a problem understanding what is told to you about your medical condition? Never    How confident are you filling out medical forms by yourself? Quite a bit    Health Literacy Good       Functional Status, IADL    Medications independent    Meal Preparation independent    Housekeeping independent    Laundry independent    Shopping independent    If for any reason you need help with day-to-day activities such as bathing, preparing meals, shopping, managing finances, etc., do you get the help you need? I get all the help I need    IADL Comments pt lives home alone, Pt has support from family.       Mental Status    General Appearance WDL WDL       Mental Status Summary    Recent Changes in Mental Status/Cognitive Functioning no changes       Employment/    Employment Status retired;disabled                   Psychosocial    No documentation.                  Abuse/Neglect    No documentation.                  Legal       Row Name 02/13/25 1145       Financial Resource Strain    How hard is it for you to pay for the very basics like food, housing, medical  care, and heating? Not hard       Financial/Legal    Source of Income disability;social security    Application for Public Assistance not applied       Legal    Criminal Activity/Legal Involvement none                   Substance Abuse    No documentation.                  Patient Forms    No documentation.                     Bethanie Russo

## 2025-02-13 NOTE — PLAN OF CARE
Goal Outcome Evaluation:  Plan of Care Reviewed With: patient        Progress: improving  Outcome Evaluation: VSS. Oxygen titrated down to 2 L NC, tolerating well. Patient still getting very SOB with ambulation. Wound and skin care performed as ordered. No other acute changes or concerns from patient at this time, plan of care ongoing.

## 2025-02-13 NOTE — PLAN OF CARE
Goal Outcome Evaluation:  Plan of Care Reviewed With: (P) patient           Outcome Evaluation: (P) Pt presents with deficits in balance and endurance and is unalke to safely ambulate. Skilled therapy services is required    Anticipated Discharge Disposition (PT): (P) inpatient rehabilitation facility

## 2025-02-13 NOTE — ED PROVIDER NOTES
Time: 7:32 PM EST  Date of encounter:  2/12/2025  Independent Historian/Clinical History and Information was obtained by:   Patient and EMS    History is limited by: N/A    Chief Complaint: Headache, altered mental status, slurred speech      History of Present Illness:  Patient is a 74 y.o. year old male who presents to the emergency department for evaluation for possible stroke.  Patient's last known well was last night.  Per EMS the patient's daughter spoke to him at about 1:30 today and reported to them that he seemed a little confused and had slurred speech.  Patient reports that he has had a severe headache that started today and does not typically have headaches.  He is currently on Eliquis for atrial fibrillation.      Patient Care Team  Primary Care Provider: Joaquim Portillo MD    Past Medical History:     No Known Allergies  Past Medical History:   Diagnosis Date    Arthritis     CHF (congestive heart failure)     Coronary artery disease     Diabetes mellitus     Hyperlipidemia     Hypertension     Sleep apnea      Past Surgical History:   Procedure Laterality Date    COLONOSCOPY  2017    COLONOSCOPY N/A 3/28/2022    Procedure: COLONOSCOPY WITH POLYPECTOMIES;  Surgeon: Tristan Cooper MD;  Location: formerly [facility] ENDOSCOPY;  Service: Gastroenterology;  Laterality: N/A;  COLON POLYPS    TONSILLECTOMY       No family history on file.    Home Medications:  Prior to Admission medications    Medication Sig Start Date End Date Taking? Authorizing Provider   acetaminophen (TYLENOL) 650 MG 8 hr tablet Take 650 mg by mouth Every 8 (Eight) Hours As Needed for Mild Pain .    Santiago Hernandez MD   amLODIPine (NORVASC) 10 MG tablet  10/20/21   Santiago Hernandez MD   atenolol (TENORMIN) 50 MG tablet Take 50 mg by mouth Daily.    Santiago Hernandez MD   atorvastatin (LIPITOR) 40 MG tablet  10/20/21   Santiago Hernandez MD   furosemide (LASIX) 80 MG tablet Take 80 mg by mouth 2 (Two) Times a Day.     "Santiago Hernandez MD   insulin aspart prot-insulin aspart (novoLOG 70/30) (70-30) 100 UNIT/ML injection Inject 25 Units under the skin into the appropriate area as directed 2 (Two) Times a Day With Meals.    Santiago Hernandez MD   Jardiance 25 MG tablet tablet  10/21/21   Santiago Hernandez MD   lisinopril (PRINIVIL,ZESTRIL) 40 MG tablet Take 40 mg by mouth Daily.    Santiago Hernandez MD   metFORMIN ER (GLUCOPHAGE-XR) 500 MG 24 hr tablet  10/19/21   Santiago Hernandez MD   simethicone (MYLICON) 80 MG chewable tablet  10/21/21   Santiago Hernandez MD   spironolactone (ALDACTONE) 25 MG tablet  11/18/21   Santiago Hernandez MD   tamsulosin (FLOMAX) 0.4 MG capsule 24 hr capsule Take 1 capsule by mouth Daily.    Santiago Hernandez MD   vitamin E 100 UNIT capsule Take 100 Units by mouth Daily.    Santiago Hernandez MD        Social History:   Social History     Tobacco Use    Smoking status: Every Day     Current packs/day: 0.25     Average packs/day: 0.3 packs/day for 5.0 years (1.3 ttl pk-yrs)     Types: Cigars, Cigarettes    Smokeless tobacco: Former   Vaping Use    Vaping status: Never Used   Substance Use Topics    Alcohol use: Not Currently     Comment: OCCASIONAL    Drug use: Defer         Review of Systems:  Review of Systems   Neurological:  Positive for speech difficulty and headaches.   Psychiatric/Behavioral:  Positive for confusion.         Physical Exam:  BP (!) 159/103 (BP Location: Right arm, Patient Position: Lying)   Pulse 114   Temp 98.4 °F (36.9 °C) (Oral)   Resp 28   Ht 188 cm (74\")   SpO2 95%   BMI 38.95 kg/m²     Physical Exam  Vitals and nursing note reviewed.   Constitutional:       Appearance: He is well-developed.   HENT:      Head: Normocephalic and atraumatic.   Eyes:      Extraocular Movements: Extraocular movements intact.   Cardiovascular:      Rate and Rhythm: Tachycardia present. Rhythm irregular.      Heart sounds: Normal heart sounds.   Pulmonary:     "  Effort: Pulmonary effort is normal.      Breath sounds: Normal breath sounds.   Abdominal:      Palpations: Abdomen is soft.   Musculoskeletal:         General: Normal range of motion.      Cervical back: Normal range of motion.   Skin:     General: Skin is warm and dry.   Neurological:      Mental Status: He is alert and oriented to person, place, and time.      Cranial Nerves: Dysarthria present.                    Medical Decision Making:      Comorbidities that affect care:    Hypertension, diabetes, coronary artery disease, CHF    External Notes reviewed:    Previous Clinic Note: Patient had a colon cancer screening colonoscopy on 3/20/2022 by Dr. Cooper      The following orders were placed and all results were independently analyzed by me:  Orders Placed This Encounter   Procedures    CT Head Without Contrast Stroke Protocol    CT Angiogram Head w AI Analysis of LVO    CT Angiogram Neck    CT CEREBRAL PERFUSION WITH & WITHOUT CONTRAST    XR Chest 1 View    [place] Draw    Comprehensive Metabolic Panel    Protime-INR    aPTT    Urinalysis With Microscopic If Indicated (No Culture) - Urine, Clean Catch    CBC Auto Differential    NPO Diet NPO Type: Strict NPO    Initiate Department's Acute Stroke Process (Team D, Code 19, etc)    Perform NIH Stroke Scale    Measure Actual Weight    Head of Bed 30 Degrees or Less    Undress and Gown    Continuous Pulse Oximetry    Vital Signs    Neuro Checks    Notify Provider for SBP <80 or >200    Notify Provider for SBP >140 (For Hemorrhagic Stroke)    No Hypotonic Fluids    Nursing Dysphagia Screening (Complete Prior to Giving anything PO)    RN to Place Order SLP Consult (IF swallow screen failed) - Eval & Treat Choosing Reason of RN Dysphagia Screen Failed    Inpatient Hospitalist Consult    Oxygen Therapy- Nasal Cannula; Titrate 1-6 LPM Per SpO2; 90 - 95%    POC Glucose Once    ECG 12 Lead ED Triage Standing Order; Acute Stroke (Onset <12 hrs)    Type & Screen    ABO RH  Specimen Verification    Insert Large Bore Peripheral IV - Right AC Preferred    Inpatient Admission    CBC & Differential    Green Top (Gel)    Lavender Top    Gold Top - SST    Light Blue Top       Medications Given in the Emergency Department:  Medications   sodium chloride 0.9 % flush 10 mL (has no administration in time range)   iopamidol (ISOVUE-370) 76 % injection 100 mL (has no administration in time range)   iopamidol (ISOVUE-370) 76 % injection 100 mL (40 mL Intravenous Given 2/12/25 1956)   iopamidol (ISOVUE-370) 76 % injection 100 mL (100 mL Intravenous Given 2/12/25 2002)   ketorolac (TORADOL) injection 15 mg (15 mg Intravenous Given 2/12/25 2040)   metoclopramide (REGLAN) injection 10 mg (10 mg Intravenous Given 2/12/25 2041)   diphenhydrAMINE (BENADRYL) injection 25 mg (25 mg Intravenous Given 2/12/25 2041)        ED Course:    ED Course as of 02/12/25 2150 Wed Feb 12, 2025 2011 EKG:    Rhythm: Atrial fibrillation  Rate: 116  Intervals: Left bundle branch block  T-wave: Nonspecific changes  ST Segment: Nonspecific changes    EKG Comparison: Not available    Interpreted by me   [NL]      ED Course User Index  [NL] Leonardo Mishra DO       Labs:    Lab Results (last 24 hours)       Procedure Component Value Units Date/Time    CBC & Differential [[id number]]  (Abnormal) Collected: 02/12/25 1952    Specimen: Blood Updated: 02/12/25 2018    Narrative:      The following orders were created for panel order CBC & Differential.  Procedure                               Abnormality         Status                     ---------                               -----------         ------                     CBC Auto Differential[[id number]]        Abnormal            Final result               Scan Slide[[id number]]                                                                    Please view results for these tests on the individual orders.    Comprehensive Metabolic Panel [[id number]]  (Abnormal) Collected: 02/12/25  1952    Specimen: Blood Updated: 02/12/25 2017     Glucose 150 mg/dL      BUN 15 mg/dL      Creatinine 1.95 mg/dL      Sodium 138 mmol/L      Potassium 4.0 mmol/L      Chloride 102 mmol/L      CO2 22.9 mmol/L      Calcium 9.5 mg/dL      Total Protein 6.6 g/dL      Albumin 3.5 g/dL      ALT (SGPT) 6 U/L      AST (SGOT) 16 U/L      Alkaline Phosphatase 66 U/L      Total Bilirubin 0.7 mg/dL      Globulin 3.1 gm/dL      A/G Ratio 1.1 g/dL      BUN/Creatinine Ratio 7.7     Anion Gap 13.1 mmol/L      eGFR 35.0 mL/min/1.73     Narrative:      GFR Categories in Chronic Kidney Disease (CKD)      GFR Category          GFR (mL/min/1.73)    Interpretation  G1                     90 or greater         Normal or high (1)  G2                      60-89                Mild decrease (1)  G3a                   45-59                Mild to moderate decrease  G3b                   30-44                Moderate to severe decrease  G4                    15-29                Severe decrease  G5                    14 or less           Kidney failure          (1)In the absence of evidence of kidney disease, neither GFR category G1 or G2 fulfill the criteria for CKD.    eGFR calculation 2021 CKD-EPI creatinine equation, which does not include race as a factor    Protime-INR [[id number]]  (Abnormal) Collected: 02/12/25 1952    Specimen: Blood Updated: 02/12/25 2014     Protime 16.4 Seconds      INR 1.27    Narrative:      Suggested Therapeutic Ranges For Oral Anticoagulant Therapy:  Level of Therapy                      INR Target Range  Standard Dose                            2.0-3.0  High Dose                                2.5-3.5  Patients not receiving anticoagulant  Therapy Normal Range                     0.86-1.15    aPTT [[id number]]  (Normal) Collected: 02/12/25 1952    Specimen: Blood Updated: 02/12/25 2014     PTT 34.1 seconds     CBC Auto Differential [[id number]]  (Abnormal) Collected: 02/12/25 1952    Specimen: Blood Updated:  02/12/25 2018     WBC 6.84 10*3/mm3      RBC 5.22 10*6/mm3      Hemoglobin 14.8 g/dL      Hematocrit 45.3 %      MCV 86.8 fL      MCH 28.4 pg      MCHC 32.7 g/dL      RDW 15.5 %      RDW-SD 48.7 fl      MPV 11.7 fL      Platelets 130 10*3/mm3      Neutrophil % 79.2 %      Lymphocyte % 10.4 %      Monocyte % 9.2 %      Eosinophil % 0.0 %      Basophil % 0.3 %      Immature Grans % 0.9 %      Neutrophils, Absolute 5.42 10*3/mm3      Lymphocytes, Absolute 0.71 10*3/mm3      Monocytes, Absolute 0.63 10*3/mm3      Eosinophils, Absolute 0.00 10*3/mm3      Basophils, Absolute 0.02 10*3/mm3      Immature Grans, Absolute 0.06 10*3/mm3      nRBC 0.0 /100 WBC              Imaging:    CT Angiogram Head w AI Analysis of LVO    Result Date: 2/12/2025  CT ANGIOGRAM HEAD W AI ANALYSIS OF LVO, CT ANGIOGRAM NECK Date of Exam: 2/12/2025 8:00 PM EST Indication: Neuro Deficit, acute, Stroke suspected Neuro deficit, acute, stroke suspected. Headache, slurred speech, confusion Comparison: Head CT and CT perfusion 2/12/2025 Technique: CTA of the head and neck was performed after the uneventful intravenous administration of iodinated contrast. Reconstructed coronal and sagittal images were also obtained. In addition, a 3-D volume rendered image was created for interpretation. Automated exposure control and iterative reconstruction methods were used. Findings: HEAD CTA: Anterior circulation: No proximal large vessel occlusion or major stenosis. Posterior circulation: There are prominent bilateral posterior communicating arteries with likely normal variant fetal-type bilateral PCAs, which appear grossly patent. The V4 segment of the left vertebral artery appears diminutive after the PICA takeoff. The V4 segment of the right vertebral artery is diminutive and is not clearly visualized distally. The basilar artery appears diminutive and does not appear convincingly opacified in its midportion, unclear whether this is secondary to diminutive  vertebrobasilar circulation and/or multifocal severe stenosis/focal occlusions. Major dural venous sinuses: No evidence of dural venous sinus thrombosis within the limitation of angiographic contrast imaging. NECK CTA: Conventional, three-vessel, aortic arch. Normal contrast enhancement in the common carotid arteries from their origins to the bifurcation. Irregularity and atherosclerotic calcification at the carotid bulbs. There is less than 50% narrowing of the right ICA by NASCET criteria at its origin. There is less than 50% narrowing of the left ICA by NASCET criteria at its origin. Otherwise, normal contrast enhancement of the internal carotid arteries from their origins to the proximal intradural portions.Irregularity and atherosclerotic calcification of the petrous and cavernous carotid arteries. Normal enhancement in the vertebral arteries from their origins to their proximal intradural portions. The left vertebral artery is dominant. Patent subclavian arteries. Angiographic contrast timing precludes accurate assessement of jugular vein patency. SOFT TISSUE NECK: No exophytic lesion seen within the aerodigestive tract. No pathologic appearing lymph nodes by imaging criteria. The visualized glands appear unremarkable. No acute or aggressive appearing osseous or soft tissue process.Degenerative changes of the imaged spine. Large right pleural effusion. Right upper lobe pulmonary nodule measuring 2.1 cm. Expiratory phase imaging.     Impression: No proximal large vessel occlusion or major stenosis of the anterior circulation. Diminutive appearing vertebrobasilar circulation with fetal-type bilateral PCAs, which appear grossly patent. The V4 segment of the right vertebral artery is diminutive and is not clearly visualized distally and in the basilar artery appears diminutive and does not appear convincingly opacified in its midportion, unclear whether this is secondary to diminutive vertebrobasilar circulation  and/or multifocal severe stenosis/focal occlusions. Recommend correlation with patient's symptoms and consider MRI for further evaluation if clinically warranted. Large right pleural effusion. Right upper lobe pulmonary nodule measuring 2.1 cm. Although this may be infectious/inflammatory, malignancy is not excluded and dedicated chest CT can be considered for further evaluation. Electronically Signed: Elian Mccauley  2/12/2025 9:14 PM EST  Workstation ID: [id number]    CT Angiogram Neck    Result Date: 2/12/2025  CT ANGIOGRAM HEAD W AI ANALYSIS OF LVO, CT ANGIOGRAM NECK Date of Exam: 2/12/2025 8:00 PM EST Indication: Neuro Deficit, acute, Stroke suspected Neuro deficit, acute, stroke suspected. Headache, slurred speech, confusion Comparison: Head CT and CT perfusion 2/12/2025 Technique: CTA of the head and neck was performed after the uneventful intravenous administration of iodinated contrast. Reconstructed coronal and sagittal images were also obtained. In addition, a 3-D volume rendered image was created for interpretation. Automated exposure control and iterative reconstruction methods were used. Findings: HEAD CTA: Anterior circulation: No proximal large vessel occlusion or major stenosis. Posterior circulation: There are prominent bilateral posterior communicating arteries with likely normal variant fetal-type bilateral PCAs, which appear grossly patent. The V4 segment of the left vertebral artery appears diminutive after the PICA takeoff. The V4 segment of the right vertebral artery is diminutive and is not clearly visualized distally. The basilar artery appears diminutive and does not appear convincingly opacified in its midportion, unclear whether this is secondary to diminutive vertebrobasilar circulation and/or multifocal severe stenosis/focal occlusions. Major dural venous sinuses: No evidence of dural venous sinus thrombosis within the limitation of angiographic contrast imaging. NECK CTA: Conventional,  three-vessel, aortic arch. Normal contrast enhancement in the common carotid arteries from their origins to the bifurcation. Irregularity and atherosclerotic calcification at the carotid bulbs. There is less than 50% narrowing of the right ICA by NASCET criteria at its origin. There is less than 50% narrowing of the left ICA by NASCET criteria at its origin. Otherwise, normal contrast enhancement of the internal carotid arteries from their origins to the proximal intradural portions.Irregularity and atherosclerotic calcification of the petrous and cavernous carotid arteries. Normal enhancement in the vertebral arteries from their origins to their proximal intradural portions. The left vertebral artery is dominant. Patent subclavian arteries. Angiographic contrast timing precludes accurate assessement of jugular vein patency. SOFT TISSUE NECK: No exophytic lesion seen within the aerodigestive tract. No pathologic appearing lymph nodes by imaging criteria. The visualized glands appear unremarkable. No acute or aggressive appearing osseous or soft tissue process.Degenerative changes of the imaged spine. Large right pleural effusion. Right upper lobe pulmonary nodule measuring 2.1 cm. Expiratory phase imaging.     Impression: No proximal large vessel occlusion or major stenosis of the anterior circulation. Diminutive appearing vertebrobasilar circulation with fetal-type bilateral PCAs, which appear grossly patent. The V4 segment of the right vertebral artery is diminutive and is not clearly visualized distally and in the basilar artery appears diminutive and does not appear convincingly opacified in its midportion, unclear whether this is secondary to diminutive vertebrobasilar circulation and/or multifocal severe stenosis/focal occlusions. Recommend correlation with patient's symptoms and consider MRI for further evaluation if clinically warranted. Large right pleural effusion. Right upper lobe pulmonary nodule  measuring 2.1 cm. Although this may be infectious/inflammatory, malignancy is not excluded and dedicated chest CT can be considered for further evaluation. Electronically Signed: Elian Mccauley  2/12/2025 9:14 PM EST  Workstation ID: [id number]    XR Chest 1 View    Result Date: 2/12/2025  XR CHEST 1 VW Date of Exam: 2/12/2025 8:13 PM EST Indication: Acute Stroke Protocol (Onset < 12 hrs) Comparison: None available. Findings: Heart size is normal. There are mild increased interstitial markings in the mid and lower lungs. There is slight blunting of the right costophrenic angle with apparent elevation of the right hemidiaphragm that may relate to subpulmonic fluid     Impression: Interstitial changes likely representing interstitial edema with small right pleural effusion Electronically Signed: Jesus Santiago  2/12/2025 8:35 PM EST  Workstation ID: [id number]    CT CEREBRAL PERFUSION WITH & WITHOUT CONTRAST    Result Date: 2/12/2025  CT CEREBRAL PERFUSION W WO CONTRAST Date of Exam: 2/12/2025 7:48 PM EST Indication: Neuro Deficit, acute, Stroke suspected Neuro deficit, acute, stroke suspected.  Comparison: None available. Technique: Axial CT images of the brain were obtained prior to and after the uneventful intravenous administration of iodinated contrast. Core blood volume, core blood flow, mean transit time, and Tmax images were obtained utilizing the Rapid software protocol. A limited CT angiogram of the head was also performed to measure the blood vessel density. The radiation dose reduction device was turned on for each scan per the ALARA (As Low as Reasonably Achievable) protocol. Findings:    There is artifact throughout examination resulting in presumed inaccurate Tmax calculations.  CBF<30% volume: 0mL Tmax>6sec volume: 148 mL Mismatch volume: 148 mL Mismatch ratio: Infinite          1. Presumed artifactual elevation of Tmax calculations. Given limitations of examination, MRI might be useful to further assess.  Electronically Signed: Noel Bob MD  2/12/2025 8:09 PM EST  Workstation ID: [id number]    CT Head Without Contrast Stroke Protocol    Result Date: 2/12/2025  CT HEAD WO CONTRAST STROKE PROTOCOL Date of Exam: 2/12/2025 7:32 PM EST Indication: Neuro Deficit, acute, Stroke suspected Neuro deficit, acute, stroke suspected. Altered mental status Comparison: None available. Technique: Axial CT images were obtained of the head without contrast administration.  Reconstructed coronal and sagittal images were also obtained. Automated exposure control and iterative construction methods were used. Findings: No acute intracranial hemorrhage.Intact appearing gray-white differentiation.No extra-axial fluid collection.No significant mass effect. No hydrocephalus. Mild generalized parenchymal volume loss. Scattered areas of periventricular and subcortical white matter hypoattenuation, nonspecific, perhaps from small vessel ischemic/hypertensive changes in a patient of this age.There are intracranial atherosclerotic calcifications. Mild scattered mucosal thickening in the paranasal sinuses.Mastoid air cells are essentially clear.. Bilateral lens replacements. No acute or aggressive appearing bony or extracranial soft tissue process.     Impression: No acute intracranial findings. Electronically Signed: Elian Mccauley  2/12/2025 7:40 PM EST  Workstation ID: [id number]       Differential Diagnosis and Discussion:    Stroke: Differential diagnosis in this patient with signs of possible ischemic stroke include TIA or ischemic stroke, hemorrhagic stroke, hypoglycemic episode, toxic or metabolic encephalopathy, paresthesias.    PROCEDURES:    Labs were collected in the emergency department and all labs were reviewed and interpreted by me.  X-ray were performed in the emergency department and all X-ray impressions were independently interpreted by me.  An EKG was performed and the EKG was interpreted by me.  CT scan was performed in the  emergency department and the CT scan radiology impression was interpreted by me.    ECG 12 Lead ED Triage Standing Order; Acute Stroke (Onset <12 hrs)   Preliminary Result   HEART FXXI=299  bpm   RR Umkshpry=508  ms   TX Interval=  ms   P Horizontal Axis=  deg   P Front Axis=  deg   QRSD Dpknxzzc=077  ms   QT Ecpeieia=484  ms   LGgD=360  ms   QRS Axis=-8  deg   T Wave Axis=153  deg   - ABNORMAL ECG -   Atrial fibrillation   Left bundle branch block   Date and Time of Study:2025-02-12 20:11:28          Procedures    MDM             Patient Care Considerations:    SEPSIS was considered but is NOT present in the emergency department as SIRS criteria is not present.      Consultants/Shared Management Plan:  Patient discussed with , teleneurology, who recommends admission for stroke workup.      Hospitalist: I have discussed the case with Dr. Paige  who agrees to accept the patient for admission.    Social Determinants of Health:    Patient is independent, reliable, and has access to care.       Disposition and Care Coordination:    Admit:   Through independent evaluation of the patient's history, physical, and imperical data, the patient meets criteria for inpatient admission to the hospital.        Final diagnoses:   Cerebrovascular accident (CVA), unspecified mechanism   Acute intractable headache, unspecified headache type   Pleural effusion, right        ED Disposition       ED Disposition   Decision to Admit    Condition   --    Comment   Level of Care: Progressive Care [20]   Diagnosis: CVA (cerebral vascular accident) [[id number]]   Certification: I Certify That Inpatient Hospital Services Are Medically Necessary For Greater Than 2 Midnights                 This medical record created using voice recognition software.             Leonardo Mishra DO  02/12/25 [id number]

## 2025-02-13 NOTE — THERAPY EVALUATION
Acute Care - Speech Language Pathology   Swallow Initial Evaluation  You     Patient Name: Avila Parrish  : 1950  MRN: [id number]  Today's Date: 2025               Admit Date: 2025    Visit Dx:     ICD-10-CM ICD-9-CM   1. Cerebrovascular accident (CVA), unspecified mechanism  I63.9 434.91   2. Acute intractable headache, unspecified headache type  R51.9 784.0   3. Pleural effusion, right  J90 511.9   4. Decreased activities of daily living (ADL)  Z78.9 V49.89   5. Difficulty in walking  R26.2 719.7     Patient Active Problem List   Diagnosis    Colon cancer screening    CVA (cerebral vascular accident)    Pleural effusion    Intractable headache    Essential hypertension    Hyperlipidemia    Type 2 diabetes mellitus with hyperglycemia     Past Medical History:   Diagnosis Date    Arthritis     CHF (congestive heart failure)     Coronary artery disease     Diabetes mellitus     Hyperlipidemia     Hypertension     Sleep apnea      Past Surgical History:   Procedure Laterality Date    COLONOSCOPY      COLONOSCOPY N/A 3/28/2022    Procedure: COLONOSCOPY WITH POLYPECTOMIES;  Surgeon: Tristan Cooper MD;  Location: [facility] ENDOSCOPY;  Service: Gastroenterology;  Laterality: N/A;  COLON POLYPS    TONSILLECTOMY         Inpatient Speech Pathology Dysphagia Evaluation        PAIN SCALE: None indicated.    PRECAUTIONS/CONTRAINDICATIONS: Standard    SUSPECTED ABUSE/NEGLECT/EXPLOITATION: None indicated.    SOCIAL/PSYCHOLOGICAL NEEDS/BARRIERS: None indicated.    PAST SOCIAL HISTORY: 74-year-old male lives at home alone    PRIOR FUNCTION: Independent    PATIENT GOALS/EXPECTATIONS: Return home    HISTORY: 74-year-old male with the above diagnosis referred for speech therapy evaluation due to stroke.  No previous patient allergy services are reported.  The patient states initially having some difficulty with slurred speech, this appears to have resolved.    CURRENT DIET LEVEL: Regular,  thin    OBJECTIVE:    TEST ADMINISTERED: Clinical dysphagia evaluation    COGNITION/SAFETY AWARENESS: Appears intact, patient followed directions and answered questions without difficulty.    BEHAVIORAL OBSERVATIONS: Alert and cooperative    ORAL MOTOR EXAM: Minimal lingual deviation to the right 4 plus/5.    VOICE QUALITY: Adequate    REFLEX EXAM: Deferred    POSTURE: Sitting upright in bed    FEEDING/SWALLOWING FUNCTION: Assessed with  thin liquids, puréed solids, crunchy solid.    CLINICAL OBSERVATIONS:  Thin liquid by cup and by straw appeared timely with vocal quality remaining clear to cervical auscultation.  Purée solid with swallow completed with laryngeal elevation noted to palpation.  Crunchy solid with adequate chewing followed by swallow completed clearing the oral cavity.    DYSPHAGIA CRITERIA: Swallow appears functional for nutritional needs.  No overt clinical signs or symptoms of aspiration were noted at the bedside.    FUNCTIONAL ASSESSMENT INSTRUMENT: Patient currently scored a level 7 of 7 on Functional Communication Measures for swallowing indicating a 0% limitation in function.    ASSESSMENT/ PLAN OF CARE:  No direct speech therapy is recommended at this time for dysphagia. Recommend rereferral should patient demonstrate change in status.    RECOMMENDATIONS:   1.   DIET: Regular solids, thin liquid.    2.  POSITION: Positioning fully upright for all p.o. intake and 30 minutes following.    3.  COMPENSATORY STRATEGIES: Alternate small bites and small sips of solids and liquids at a slow rate.    Pt/responsible party agrees with plan of care and has been informed of all alternatives, risks and benefits.                            Anticipated Discharge Disposition (SLP): home with assist (02/13/25 1357)                                                               EDUCATION  The patient has been educated in the following areas:   Modified Diet Instruction.                Time Calculation:    Time  Calculation- SLP       Row Name 02/13/25 1051             Time Calculation- SLP    SLP Start Time 0730  -TB      SLP Stop Time 0830  -TB      SLP Time Calculation (min) 60 min  -TB      SLP Received On 02/13/25  -TB         Untimed Charges    SLP Eval/Re-eval  ST Eval Oral Pharyng Swallow - 39705  -TB      60819-ND Eval Oral Pharyng Swallow Minutes 60  -TB         Total Minutes    Untimed Charges Total Minutes 60  -TB       Total Minutes 60  -TB                User Key  (r) = Recorded By, (t) = Taken By, (c) = Cosigned By      Initials Name Provider Type    TB Ya Valero SLP Speech and Language Pathologist                    Therapy Charges for Today       Code Description Service Date Service Provider Modifiers Qty    [id number] HC ST EVAL ORAL PHARYNG SWALLOW 4 2/13/2025 Ya Valero SLP GN 1                 BREANN Matthew  2/13/2025

## 2025-02-13 NOTE — PLAN OF CARE
Goal Outcome Evaluation:  Plan of Care Reviewed With: patient        Progress: no change  Outcome Evaluation: NIH completed per order. BP high. MAHAMED Lopes aware.

## 2025-02-13 NOTE — CONSULTS
Consult received per Stroke Protocol. Patient with a noted DM diagnosis, with a current HbA1c of 6.9%, and an estimated average glucose of 151 mg/dl. Patient's home regimen consists of Mounjaro 10 mg weekly, Metformin ER 1000 mg daily, and Novolog 70/30 16 units BID with meals.

## 2025-02-13 NOTE — PROGRESS NOTES
[facility]   Hospitalist Progress Note  Date: 2025  Patient Name: Avila Parrish  : 1950  MRN: [id number]  Date of admission: 2025  Room/Bed: 213/1      Subjective   Subjective     Chief Complaint:   Chief Complaint   Patient presents with    Headache    Altered Mental Status       Summary:     74 y.o. male who presented with chief complaint of shortness of breath.  He was also considering possible slurring of speech, headache and altered mental status.  He also apparently had leg weakness.  Last known normal was the night prior.  Patient's daughter spoke to him around 1:30 in the afternoon and reported that he seemed confused and had slurred speech.  He did not present to the emergency department until the evening.  His primary complaint is a severe headache that started earlier in the day.  He does not get headaches on a normal basis.  He is on Eliquis for A-fib.  He was evaluated by teleneurology and also had stroke CT package that did not show acute intracranial pathology.     Patient is on bumex at home and states that lately he has had difficulty breathing.     Interval Followup:     Patient underwent CTA head and neck, MRI brain without contrast and CT perfusion with and without contrast. MRI was negative for acute abnormalities.  Was noted to have incidental large right pleural effusion and neck CTA.  Patient remains on 6 L nasal cannula (baseline room air).  He is generally more concerned with his shortness of breath than any possible stroke.        Objective   Objective     Vitals:   Temp:  [97.3 °F (36.3 °C)-100.2 °F (37.9 °C)] 98.1 °F (36.7 °C)  Heart Rate:  [] 109  Resp:  [18-28] 18  BP: (120-169)/() 120/73  Flow (L/min) (Oxygen Therapy):  [2-6] 2    Physical Exam   General: Awake, alert, in no acute distress  HENT: Atraumatic, normocephalic. Nasal cannula in place 6 L minute oxygen flow rate  Eyes: pupils equal, round, without scleral icterus  Cardiovascular:  Regular rate and rhythm, no murmurs   Pulmonary: CTA bilaterally; no wheezes; no conversational dyspnea  Gastrointestinal: Soft nontender nondistended  Musculoskeletal: No gross deformities, or ankle edema  Skin: No jaundice, no rash on exposed skin appreciated  Neuro: speech clear; no tremor  Psych: Mood and affect appropriate  : No suprapubic tenderness    Result Review    Result Review:  I have personally reviewed these results:  [x]  Laboratory      Lab 02/12/25 1952   WBC 6.84   HEMOGLOBIN 14.8   HEMATOCRIT 45.3   PLATELETS 130*   NEUTROS ABS 5.42   IMMATURE GRANS (ABS) 0.06*   LYMPHS ABS 0.71   MONOS ABS 0.63   EOS ABS 0.00   MCV 86.8   PROTIME 16.4*   APTT 34.1         Lab 02/13/25 0513 02/12/25 1952   SODIUM  --  138   POTASSIUM  --  4.0   CHLORIDE  --  102   CO2  --  22.9   ANION GAP  --  13.1   BUN  --  15   CREATININE  --  1.95*   EGFR  --  35.0*   GLUCOSE  --  150*   CALCIUM  --  9.5   HEMOGLOBIN A1C 6.90*  --          Lab 02/12/25 1952   TOTAL PROTEIN 6.6   ALBUMIN 3.5   GLOBULIN 3.1   ALT (SGPT) 6   AST (SGOT) 16   BILIRUBIN 0.7   ALK PHOS 66         Lab 02/12/25 1952   PROTIME 16.4*   INR 1.27*         Lab 02/13/25 0513   CHOLESTEROL 172   LDL CHOL 106*   HDL CHOL 35*   TRIGLYCERIDES 179*         Lab 02/12/25 2032 02/12/25 1952   ABO TYPING O O   RH TYPING Negative Negative   ANTIBODY SCREEN  --  Negative         Brief Urine Lab Results  (Last result in the past 365 days)        Color   Clarity   Blood   Leuk Est   Nitrite   Protein   CREAT   Urine HCG        02/13/25 0309 Yellow   Clear   Moderate (2+)   Negative   Negative   >=300 mg/dL (3+)                 [x]  Microbiology   Microbiology Results (last 10 days)       ** No results found for the last 240 hours. **          [x]  Radiology  MRI Brain Without Contrast    Result Date: 2/13/2025  Impression: 1.No acute intracranial abnormality. 2.Chronic microvascular ischemic changes. 3.Diffuse brain atrophy. Electronically Signed: Randall  MD Natalya  2/13/2025 8:39 AM EST  Workstation ID: [id number]    CT Angiogram Head w AI Analysis of LVO    Result Date: 2/12/2025  Impression: No proximal large vessel occlusion or major stenosis of the anterior circulation. Diminutive appearing vertebrobasilar circulation with fetal-type bilateral PCAs, which appear grossly patent. The V4 segment of the right vertebral artery is diminutive and is not clearly visualized distally and in the basilar artery appears diminutive and does not appear convincingly opacified in its midportion, unclear whether this is secondary to diminutive vertebrobasilar circulation and/or multifocal severe stenosis/focal occlusions. Recommend correlation with patient's symptoms and consider MRI for further evaluation if clinically warranted. Large right pleural effusion. Right upper lobe pulmonary nodule measuring 2.1 cm. Although this may be infectious/inflammatory, malignancy is not excluded and dedicated chest CT can be considered for further evaluation. Electronically Signed: Elian Mccauley  2/12/2025 9:14 PM EST  Workstation ID: [id number]    CT Angiogram Neck    Result Date: 2/12/2025  Impression: No proximal large vessel occlusion or major stenosis of the anterior circulation. Diminutive appearing vertebrobasilar circulation with fetal-type bilateral PCAs, which appear grossly patent. The V4 segment of the right vertebral artery is diminutive and is not clearly visualized distally and in the basilar artery appears diminutive and does not appear convincingly opacified in its midportion, unclear whether this is secondary to diminutive vertebrobasilar circulation and/or multifocal severe stenosis/focal occlusions. Recommend correlation with patient's symptoms and consider MRI for further evaluation if clinically warranted. Large right pleural effusion. Right upper lobe pulmonary nodule measuring 2.1 cm. Although this may be infectious/inflammatory, malignancy is not excluded and dedicated  chest CT can be considered for further evaluation. Electronically Signed: Elian WARREN Garrick  2/12/2025 9:14 PM EST  Workstation ID: [id number]    XR Chest 1 View    Result Date: 2/12/2025  Impression: Interstitial changes likely representing interstitial edema with small right pleural effusion Electronically Signed: Jesus Santiago  2/12/2025 8:35 PM EST  Workstation ID: [id number]    CT CEREBRAL PERFUSION WITH & WITHOUT CONTRAST    Result Date: 2/12/2025   1. Presumed artifactual elevation of Tmax calculations. Given limitations of examination, MRI might be useful to further assess. Electronically Signed: Noel Bob MD  2/12/2025 8:09 PM EST  Workstation ID: [id number]    CT Head Without Contrast Stroke Protocol    Result Date: 2/12/2025  Impression: No acute intracranial findings. Electronically Signed: Elian KELVIN Mccauley  2/12/2025 7:40 PM EST  Workstation ID: [id number]   []  EKG/Telemetry   []  Cardiology/Vascular   []  Pathology  []  Old records  []  Other:    Assessment & Plan   Assessment / Plan     Assessment     TIA?    Large pleural effusion    Intractable headache    Essential hypertension    Hyperlipidemia    Type 2 diabetes mellitus with hyperglycemia    Acute Chf exacerbation     Plan  Twice daily Lasix 80 mg for CHF exacerbation with effusion  strict ins and outs, daily weights, monitor clinically for improvement, CHF protocol  Follow-up echo  Neuro symptoms resolved, no evidence of acute CVA or acute changes on imaging  PT OT recommending IPR   Continue Eliquis  Maintain euglycemia.  Add 10 units detemir at night   Aspirin statin  Supportive care  Dysphagia screen    Disposition: Monitor clinically for respiratory improvement.  Wean oxygen.  If no improvement with diuresis may need thoracentesis prior to discharge to [facility]     Discussed with RN.    VTE Prophylaxis:  Pharmacologic & mechanical VTE prophylaxis orders are present.        CODE STATUS:   Code Status (Patient has no pulse and is not breathing): CPR  (Attempt to Resuscitate)  Medical Interventions (Patient has pulse or is breathing): Full Support      Electronically signed by Phill Shelton MD, 2/13/2025, 13:11 EST.

## 2025-02-13 NOTE — THERAPY EVALUATION
Patient Name: Avila Parrish  : 1950    MRN: [id number]                              Today's Date: 2025       Admit Date: 2025    Visit Dx:     ICD-10-CM ICD-9-CM   1. Cerebrovascular accident (CVA), unspecified mechanism  I63.9 434.91   2. Acute intractable headache, unspecified headache type  R51.9 784.0   3. Pleural effusion, right  J90 511.9   4. Decreased activities of daily living (ADL)  Z78.9 V49.89     Patient Active Problem List   Diagnosis    Colon cancer screening    CVA (cerebral vascular accident)    Pleural effusion    Intractable headache    Essential hypertension    Hyperlipidemia    Type 2 diabetes mellitus with hyperglycemia     Past Medical History:   Diagnosis Date    Arthritis     CHF (congestive heart failure)     Coronary artery disease     Diabetes mellitus     Hyperlipidemia     Hypertension     Sleep apnea      Past Surgical History:   Procedure Laterality Date    COLONOSCOPY      COLONOSCOPY N/A 3/28/2022    Procedure: COLONOSCOPY WITH POLYPECTOMIES;  Surgeon: Tristan Cooper MD;  Location: [facility] ENDOSCOPY;  Service: Gastroenterology;  Laterality: N/A;  COLON POLYPS    TONSILLECTOMY        General Information       Row Name 25 0905          OT Time and Intention    Subjective Information complains of;pain;fatigue;weakness  -EG     Document Type evaluation  -EG     Mode of Treatment individual therapy;occupational therapy  -EG     Patient Effort good  -EG     Symptoms Noted During/After Treatment fatigue;shortness of breath  -EG       Row Name 25 0905          General Information    Patient Profile Reviewed yes  Lives alone; Ind. w/ADL's; Mobility no AD prior but does own a RW if he needs it; Tub shower and walk-in shower per report, mainly used tub shower; owns seat but did not use; reports he was still driving daily etc.  -EG     Prior Level of Function independent:;ADL's;driving;all household mobility;community mobility  -EG     Existing  "Precautions/Restrictions oxygen therapy device and L/min;fall  6L O2  -EG     Barriers to Rehab none identified  -EG       Row Name 02/13/25 0905          Occupational Profile    Reason for Services/Referral (Occupational Profile) Patient is a 74-year-old male admitted [facility] on 2/12/[facility]25.  OT was consulted due to possible CVA with complaints of increased shortness of breath.  OT to assess for new onset of deficits and limitations in ADL/transfer performance no previous OT services for current condition.  -EG     Patient Goals (Occupational Profile) \"Be able to go home\"  -EG       Row Name 02/13/25 0905          Living Environment    People in Home alone  -EG       Row Name 02/13/25 0905          Home Main Entrance    Number of Stairs, Main Entrance none  ramped entrance  -EG       Row Name 02/13/25 0905          Cognition    Orientation Status (Cognition) oriented x 3  -EG       Row Name 02/13/25 0905          Safety Issues/Impairments Affecting Functional Mobility    Impairments Affecting Function (Mobility) balance;shortness of breath;strength;endurance/activity tolerance;pain  -EG               User Key  (r) = Recorded By, (t) = Taken By, (c) = Cosigned By      Initials Name Provider Type    EG Marilee Bates, OT Occupational Therapist                     Mobility/ADL's       Row Name 02/13/25 0911          Bed Mobility    Bed Mobility bed mobility (all) activities;supine-sit  -EG     Supine-Sit [place] (Bed Mobility) minimum assist (75% patient effort);moderate assist (50% patient effort);verbal cues;nonverbal cues (demo/gesture)  -EG     Assistive Device (Bed Mobility) bed rails;head of bed elevated  -EG       Row Name 02/13/25 0911          Transfers    Transfers sit-stand transfer;stand-sit transfer;toilet transfer  -EG       Row Name 02/13/25 0911          Sit-Stand Transfer    Sit-Stand [place] (Transfers) minimum assist (75% patient effort);verbal cues  -EG     Assistive " Device (Sit-Stand Transfers) walker, front-wheeled  -EG     Comment, (Sit-Stand Transfer) cues and education on hand placement to push from surfaces  -EG       Row Name 02/13/25 0911          Stand-Sit Transfer    Stand-Sit [place] (Transfers) minimum assist (75% patient effort);verbal cues  -EG     Assistive Device (Stand-Sit Transfers) walker, front-wheeled  -EG       Row Name 02/13/25 0911          Toilet Transfer    Type (Toilet Transfer) stand pivot/stand step  -EG     [place] Level (Toilet Transfer) minimum assist (75% patient effort);contact guard;verbal cues  -EG     Assistive Device (Toilet Transfer) commode, 3-in-1;grab bars/safety frame  -EG       Row Name 02/13/25 0911          Functional Mobility    Functional Mobility- Ind. Level contact guard assist;minimum assist (75% patient effort)  -EG     Functional Mobility- Comment Patient performed functional mobility to and from bathroom using RW; one LOb occuring requiring [place] with instability and decreased righting reactions  -EG       Row Name 02/13/25 0911          Activities of Daily Living    BADL Assessment/Intervention bathing;upper body dressing;lower body dressing;grooming;feeding;toileting  -EG       Row Name 02/13/25 0911          Bathing Assessment/Intervention    [place] Level (Bathing) bathing skills;upper body;lower body;moderate assist (50% patient effort);maximum assist (25% patient effort)  -EG       Row Name 02/13/25 0911          Upper Body Dressing Assessment/Training    [place] Level (Upper Body Dressing) upper body dressing skills;minimum assist (75% patient effort)  -EG       Row Name 02/13/25 0911          Lower Body Dressing Assessment/Training    [place] Level (Lower Body Dressing) lower body dressing skills;maximum assist (25% patient effort)  -EG       Row Name 02/13/25 0911          Grooming Assessment/Training    [place] Level (Grooming) grooming skills;set up  -EG       Row Name 02/13/25 0911           Self-Feeding Assessment/Training    [place] Level (Feeding) feeding skills;set up  -EG       Row Name 02/13/25 0911          Toileting Assessment/Training    [place] Level (Toileting) toileting skills;minimum assist (75% patient effort)  -EG     Comment, (Toileting) currently on condom cath purewick but able to use restroom with OT on evaluation; possible continued assessment needed for incontinence  -EG               User Key  (r) = Recorded By, (t) = Taken By, (c) = Cosigned By      Initials Name Provider Type    EG Marilee Bates OT Occupational Therapist                   Obj/Interventions       Row Name 02/13/25 0913          Sensory Assessment (Somatosensory)    Sensory Assessment (Somatosensory) UE sensation intact  -EG       Row Name 02/13/25 0913          Vision Assessment/Intervention    Visual Impairment/Limitations WFL  -EG       [facility] Name 02/13/25 0913          Range of Motion Comprehensive    General Range of Motion bilateral upper extremity ROM WFL  -EG     Comment, General Range of Motion RUE WNL; LUE WFL shoulder flexion  -EG       [facility] Name 02/13/25 0913          Strength Comprehensive (MMT)    Comment, General Manual Muscle Testing (MMT) Assessment 3+/5 L shoulder flexion; RUE 4-/5 grossly  -EG       [facility] Name 02/13/25 0913          Motor Skills    Motor Skills coordination;functional endurance  -EG     Coordination WFL  -EG     Functional Endurance fair- on 6L O2 NC  -EG       [facility] Name 02/13/25 0913          Balance    Balance Assessment sit to stand dynamic balance;standing static balance  -EG     Sit to Stand Dynamic Balance minimal assist  -EG     Static Standing Balance minimal assist;contact guard;verbal cues  -EG     Position/Device Used, Standing Balance walker, rolling  -EG               User Key  (r) = Recorded By, (t) = Taken By, (c) = Cosigned By      Initials Name Provider Type    EG Marilee Bates OT Occupational Therapist                   Goals/Plan       [facility]  Name 02/13/25 0915          Bed Mobility Goal 1 (OT)    Activity/Assistive Device (Bed Mobility Goal 1, OT) bed mobility activities, all  -EG     [place] Level/Cues Needed (Bed Mobility Goal 1, OT) modified independence  -EG     Time Frame (Bed Mobility Goal 1, OT) long term goal (LTG);10 days  -EG       Row Name 02/13/25 0915          Transfer Goal 1 (OT)    Activity/Assistive Device (Transfer Goal 1, OT) transfers, all  -EG     [place] Level/Cues Needed (Transfer Goal 1, OT) modified independence  -EG     Time Frame (Transfer Goal 1, OT) long term goal (LTG);10 days  -EG       Row Name 02/13/25 0915          Bathing Goal 1 (OT)    Activity/Device (Bathing Goal 1, OT) bathing skills, all  -EG     [place] Level/Cues Needed (Bathing Goal 1, OT) modified independence  -EG     Time Frame (Bathing Goal 1, OT) long term goal (LTG);10 days  -EG       Row Name 02/13/25 0915          Dressing Goal 1 (OT)    Activity/Device (Dressing Goal 1, OT) dressing skills, all  -EG     [place]/Cues Needed (Dressing Goal 1, OT) modified independence  -EG     Time Frame (Dressing Goal 1, OT) long term goal (LTG);10 days  -EG       Row Name 02/13/25 0915          Toileting Goal 1 (OT)    Activity/Device (Toileting Goal 1, OT) toileting skills, all  -EG     [place] Level/Cues Needed (Toileting Goal 1, OT) modified independence  -EG     Time Frame (Toileting Goal 1, OT) long term goal (LTG);10 days  -EG       Row Name 02/13/25 0915          Grooming Goal 1 (OT)    Activity/Device (Grooming Goal 1, OT) grooming skills, all  -EG     [place] (Grooming Goal 1, OT) modified independence  -EG     Time Frame (Grooming Goal 1, OT) long term goal (LTG);10 days  -EG       Row Name 02/13/25 0915          Problem Specific Goal 1 (OT)    Problem Specific Goal 1 (OT) Patient will improve functional endurance skills during ADL's to fair on room air to return home at [facility].  -EG     Time Frame (Problem Specific Goal 1, OT) long  term goal (LTG);10 days  -EG       Row Name 02/13/25 0915          Therapy Assessment/Plan (OT)    Planned Therapy Interventions (OT) activity tolerance training;functional balance retraining;occupation/activity based interventions;adaptive equipment training;BADL retraining;neuromuscular control/coordination retraining;patient/caregiver education/training;transfer/mobility retraining;strengthening exercise  -EG               User Key  (r) = Recorded By, (t) = Taken By, (c) = Cosigned By      Initials Name Provider Type    EG Marilee Bates, BRENDON Occupational Therapist                   Clinical Impression       Row Name 02/13/25 0915          Pain Assessment    Pretreatment Pain Rating 4/10  -EG     Posttreatment Pain Rating 4/10  -EG     Pain Location back;extremity  -EG     Pain Side/Orientation bilateral;upper  -EG       Row Name 02/13/25 0915          Plan of Care Review    Plan of Care Reviewed With patient  -EG     Progress no change  -EG     Outcome Evaluation Patient presents with limitations of decreased functional strength, balance, endurance and limited safety/insight into own deficits requiring need for skilled OT services to facilitate return to prior level of function with ADLs.  -EG       Row Name 02/13/25 0915          Therapy Assessment/Plan (OT)    Rehab Potential (OT) fair  -EG     Criteria for Skilled Therapeutic Interventions Met (OT) yes;skilled treatment is necessary;meets criteria  -EG     Therapy Frequency (OT) 5 times/wk  -EG       Row Name 02/13/25 0915          Therapy Plan Review/Discharge Plan (OT)    Equipment Needs Upon Discharge (OT) --  O2 equipment; continue to assess needs with improvement  -EG     Anticipated Discharge Disposition (OT) sub acute care setting  -EG       Row Name 02/13/25 0915          Positioning and Restraints    Pre-Treatment Position in bed  -EG     Post Treatment Position bed  -EG     In Bed supine;call light within reach;encouraged to call for assist;exit  alarm on  -EG               User Key  (r) = Recorded By, (t) = Taken By, (c) = Cosigned By      Initials Name Provider Type    EG Marilee Bates OT Occupational Therapist                   Outcome Measures       Row Name 02/13/25 0916          How much help from another is currently needed...    Putting on and taking off regular lower body clothing? 2  -EG     Bathing (including washing, rinsing, and drying) 2  -EG     Toileting (which includes using toilet bed pan or urinal) 3  -EG     Putting on and taking off regular upper body clothing 3  -EG     Taking care of personal grooming (such as brushing teeth) 4  -EG     Eating meals 4  -EG     AM-PAC 6 Clicks Score (OT) 18  -EG       Row Name 02/13/25 0150          How much help from another person do you currently need...    Turning from your back to your side while in flat bed without using bedrails? 3  -RF     Moving from lying on back to sitting on the side of a flat bed without bedrails? 3  -RF     Moving to and from a bed to a chair (including a wheelchair)? 2  -RF     Standing up from a chair using your arms (e.g., wheelchair, bedside chair)? 2  -RF     Climbing 3-5 steps with a railing? 1  -RF     To walk in hospital room? 2  -RF     AM-PAC 6 Clicks Score (PT) 13  -RF       Row Name 02/13/25 0916          Functional Assessment    Outcome Measure Options AM-PAC 6 Clicks Daily Activity (OT);Optimal Instrument  -EG       Row Name 02/13/25 0916          Optimal Instrument    Optimal Instrument Optimal - 3  -EG     Bending/Stooping 4  -EG     Standing 2  -EG     Reaching 2  -EG     From the list, choose the 3 activities you would most like to be able to do without any difficulty Standing;Reaching;Bending/stooping  -EG     Total Score Optimal - 3 8  -EG               User Key  (r) = Recorded By, (t) = Taken By, (c) = Cosigned By      Initials Name Provider Type    EG Marilee Bates OT Occupational Therapist    Sarai Chávez RN Registered Nurse                     Occupational Therapy Education       Title: PT OT SLP Therapies (In Progress)       Topic: Occupational Therapy (In Progress)       Point: ADL training (In Progress)       Description:   Instruct learner(s) on proper safety adaptation and remediation techniques during self care or transfers.   Instruct in proper use of assistive devices.                  Learning Progress Summary            Patient BECKY Peguero, NR by EG at 2/13/2025 0919    Comment: Education on need for PLB and energ conservation techniques  education on OT services and benefits of continued rehab services  Education on safe transfer techniques  education on fall risk prevention                      Point: Home exercise program (In Progress)       Description:   Instruct learner(s) on appropriate technique for monitoring, assisting and/or progressing therapeutic exercises/activities.                  Learning Progress Summary            Patient BECKY Peguero, NR by EG at 2/13/2025 0919    Comment: Education on need for PLB and energ conservation techniques  education on OT services and benefits of continued rehab services  Education on safe transfer techniques  education on fall risk prevention                      Point: Precautions (In Progress)       Description:   Instruct learner(s) on prescribed precautions during self-care and functional transfers.                  Learning Progress Summary            Patient BECKY Peguero, NR by EG at 2/13/2025 0919    Comment: Education on need for PLB and energ conservation techniques  education on OT services and benefits of continued rehab services  Education on safe transfer techniques  education on fall risk prevention                      Point: Body mechanics (In Progress)       Description:   Instruct learner(s) on proper positioning and spine alignment during self-care, functional mobility activities and/or exercises.                  Learning Progress Summary            Patient BECKY Peguero NR by EG  at 2/13/2025 0919    Comment: Education on need for PLB and energ conservation techniques  education on OT services and benefits of continued rehab services  Education on safe transfer techniques  education on fall risk prevention                                      User Key       Initials Effective Dates Name Provider Type Discipline    EG 09/14/22 -  Marilee Bates, OT Occupational Therapist OT                  OT Recommendation and Plan  Planned Therapy Interventions (OT): activity tolerance training, functional balance retraining, occupation/activity based interventions, adaptive equipment training, BADL retraining, neuromuscular control/coordination retraining, patient/caregiver education/training, transfer/mobility retraining, strengthening exercise  Therapy Frequency (OT): 5 times/wk  Plan of Care Review  Plan of Care Reviewed With: patient  Progress: no change  Outcome Evaluation: Patient presents with limitations of decreased functional strength, balance, endurance and limited safety/insight into own deficits requiring need for skilled OT services to facilitate return to prior level of function with ADLs.     Time Calculation:   Evaluation Complexity (OT)  Review Occupational Profile/Medical/Therapy History Complexity: expanded/moderate complexity  Assessment, Occupational Performance/Identification of Deficit Complexity: 3-5 performance deficits  Clinical Decision Making Complexity (OT): detailed assessment/moderate complexity  Overall Complexity of Evaluation (OT): moderate complexity     Time Calculation- OT       Row Name 02/13/25 0921             Time Calculation- OT    OT Received On 02/13/25  -EG      OT Goal Re-Cert Due Date 02/22/25  -EG         Untimed Charges    OT Eval/Re-eval Minutes 35  -EG         Total Minutes    Untimed Charges Total Minutes 35  -EG       Total Minutes 35  -EG                User Key  (r) = Recorded By, (t) = Taken By, (c) = Cosigned By      Initials Name Provider Type     Marilee Smith OT Occupational Therapist                  Therapy Charges for Today       Code Description Service Date Service Provider Modifiers Qty    [id number] HC OT EVAL MOD COMPLEXITY 3 2/13/2025 Marilee Bates OT GO 1                 Marilee Bates OT  2/13/2025

## 2025-02-13 NOTE — PLAN OF CARE
Goal Outcome Evaluation:  Plan of Care Reviewed With: patient        Progress: no change  Outcome Evaluation: Patient presents with limitations of decreased functional strength, balance, endurance and limited safety/insight into own deficits requiring need for skilled OT services to facilitate return to prior level of function with ADLs.    Anticipated Discharge Disposition (OT): sub acute care setting

## 2025-02-13 NOTE — CONSULTS
[facility]  [facility] Tele[facility]urology Consult Services      Patient Name:   Avila Parrish  YOB: 1950  Identification Number:   MRN - [id number]  Date of Service:   02/12/2025 19:29:36    Diagnosis:        I63.00 - Cerebrovascular accident (CVA) due to thrombosis of precerebral artery (HCCC)    Impression:       History and exam suggestive of possible lacunar infarct. Small cardiogenic infarcts cannot be ruled out. On exam he has mild speech slurring. He is already on Eliquis. We will continue him on that. CT scan of the head is pending. Will recommend getting an MRI of the head and further workup. On prelim review, CT angiogram and perfusion are negative but will wait for the official report. All this was discussed with the ED attending. Patient would be further admitted for stroke workup and neurology follow-up. Patient is complaining of a headache. If the headaches persist and patient has some evidence of infection including fever or elevated white cell count, might have to consider putting him on broad-spectrum antibiotics as he cannot get a lumbar puncture currently, being on Eliquis. Chances of CNS infection are low so we will recommend doing stroke workup first and treating his headache symptomatically.    Our recommendations are outlined below.    Recommendations:          Stroke/Telemetry Floor        Neuro Checks        Bedside Swallow Eval        DVT Prophylaxis        IV Fluids, Normal Saline        Head of Bed 30 Degrees        Euglycemia and Avoid Hyperthermia (PRN Acetaminophen)        Antihypertensives PRN if Blood pressure is greater than 220/120 or there is a concern for End organ damage/contraindications for permissive HTN. If blood pressure is greater than 220/120 give labetalol PO or IV or Vasotec IV with a goal of 15% reduction in BP during the first 24 hours.        ------------------------------------------------------------------------------    Advanced  Imaging:  CTA Head and Neck Completed.    CTP Completed.    LVO:No    Patient in not a candidate for CHRISTIAN      Metrics:  Last Known Well: 02/12/2025 16:00:00  Dispatch Time: 02/12/2025 19:28:54  Arrival Time: 02/12/2025 19:25:00  Initial Response Time: 02/12/2025 19:33:39  Symptoms: Speech slurring.  Initial patient interaction: 02/12/2025 19:40:15  NIHSS Assessment Completed: 02/12/2025 19:47:51  Patient is not a candidate for Thrombolytic.  Thrombolytic Medical Decision: 02/12/2025 19:47:55  Patient was not deemed candidate for Thrombolytic because of following reasons:  Use of NOAC in last 48 hrs. .    CT head showed no acute hemorrhage or acute core infarct.    Primary Provider Notified of Diagnostic Impression and Management Plan on: 02/12/2025 20:00:32        ------------------------------------------------------------------------------    History of Present Illness:  Patient is a 74 year old Male.    Patient was brought by EMS for symptoms of Speech slurring.  74-year-old male with past medical history of hypertension, morbid obesity, A-fib on Eliquis came to the hospital because of some speech slurring, headache and also reported some leg weakness. Patient is a poor historian. He reports he mainly came to the hospital because of a headache. He denies any new visual symptoms. Denies any focal body numbness.      Past Medical History:       Hypertension       Hyperlipidemia       Atrial Fibrillation    Medications:    Anticoagulant use:  Yes true  No Antiplatelet use  Reviewed EMR for current medications    Allergies:   Reviewed    Social History:  Patient Is:   Smoking: No    Family History:    There is no family history of premature cerebrovascular disease pertinent to this consultation    ROS :  14 Points Review of Systems was performed and was negative except mentioned in HPI.    Past Surgical History:  There Is No Surgical History Contributory To Today’s Visit        Examination:  BP(160/130),  Pulse(96), Blood Glucose(130)  1A: Level of Consciousness - Alert; keenly responsive + 0  1B: Ask Month and Age - 1 Question Right + 1  1C: Blink Eyes & Squeeze Hands - Performs Both Tasks + 0  2: Test Horizontal Extraocular Movements - Normal + 0  3: Test Visual Fields - No Visual Loss + 0  4: Test Facial Palsy (Use Grimace if Obtunded) - Normal symmetry + 0  5A: Test Left Arm Motor Drift - No Drift for 10 Seconds + 0  5B: Test Right Arm Motor Drift - No Drift for 10 Seconds + 0  6A: Test Left Leg Motor Drift - No Drift for 5 Seconds + 0  6B: Test Right Leg Motor Drift - Drift, but doesn't hit bed + 1  7: Test Limb Ataxia (FNF/Heel-Shin) - No Ataxia + 0  8: Test Sensation - Normal; No sensory loss + 0  9: Test Language/Aphasia - Normal; No aphasia + 0  10: Test Dysarthria - Mild-Moderate Dysarthria: Slurring but can be understood + 1  11: Test Extinction/Inattention - No abnormality + 0    NIHSS Score: 3      Pre-Morbid Modified [place] Scale:  2 Points = Slight disability; unable to carry out all previous activities, but able to look after own affairs without assistance    Spoke with : Dr Mishra  I reviewed the available imaging via Rapid and initiated discussion with the primary provider    This consult was conducted in real time using interactive audio and video technology. Patient was informed of the technology being used for this visit and agreed to proceed. Patient located in hospital and provider located at home/office setting.      Patient is being evaluated for possible acute neurologic impairment and high probability of imminent or life-threatening deterioration. I spent total of 39 minutes providing care to this patient, including time for face to face visit via telemedicine, review of medical records, imaging studies and discussion of findings with providers, the patient and/or family.      Dr Eliot Thomas      [facility]  For Inpatient follow-up with [facility] physician please call [facility] at  [phone]. As we are not an outpatient service for any post hospital discharge needs please contact the hospital for assistance.  If you have any questions for the [facility] physicians or need to reconsult for clinical or diagnostic changes please contact us via [facility] at [phone].    CTP non diagnostic. CTA IMPRESSION:  Impression:  No proximal large vessel occlusion or major stenosis of the anterior circulation.     Diminutive appearing vertebrobasilar circulation with fetal-type bilateral PCAs, which appear grossly patent. The V4 segment of the right vertebral artery is diminutive and is not clearly visualized distally and in the basilar artery appears diminutive   and does not appear convincingly opacified in its midportion, unclear whether this is secondary to diminutive vertebrobasilar circulation and/or multifocal severe stenosis/focal occlusions. Recommend correlation with patient's symptoms and consider MRI   for further evaluation if clinically warranted.     Will suggest getting MRI of the head and MRA of the head and neck to clarify above findings

## 2025-02-13 NOTE — PROGRESS NOTES
[facility]  [facility] Tele[facility]urology Consult Services    Routine Consult Follow-Up    Patient Name:   Avila Parrish  YOB: 1950  Identification Number:   MRN - [id number]  Date of Service:   02/13/2025 15:44:18    Diagnosis        R47.81 - Slurred speech        R41.0 - Disorientation, unspecified    Impression  History and exam suggestive of possible lacunar infarct/TIA. Small cardiogenic infarcts cannot be ruled out. On exam he has mild speech slurring. CT/CT angiogram and perfusion are without acute abn. MRI without acute abn.    Plan:  Interventions  -TNK- No, as above  -Endovascular - No, as above  Imaging  -MRI reviewed; non acute  -CTA completed  -Echo w/ bubble pending  Medications:  -Antiplatelet/Anticoagulation: Okay to re-start home Eliquis  -Statin: Lipitor 80 mg QHS  -Blood Pressure Goals: Per primary service, -140 mmHg  -DVT prophylaxis: per primary service  Primary Team:  -NIH neuro checks q4h. Call if increase of NIH score by 4, sudden HA or decreased LOC. Stat CT w/o contrast recommended.  -Cardiac Telemetry  -CBC/BMP/PT/INR reviewed  -HbA1c/Lipid panel reviewed  -Glucose goal < 180 mg/dl  -ST/ PT/OT to eval and treat    Neurology will sign off at this time. Call with any questions or concerns. Discussed with staff.    Our recommendations are outlined below    Diagnostic Studies :  Holter/Loop Recorder as outpatient with cardiology follow up    Antithrombotic Medication :  Statins for LDL goal less than 70    Anticoagulant Medication :  Eliquis 5mg bid    Nursing Recommendations :  IV Fluids, avoid dextrose containing fluids, Maintain euglycemiaNeuro checks q4 hrs x 24 hrs and then per shiftHead of bed 30 degreesContinue with Telemetry    Consultations :  Recommend Speech therapy if failed dysphagia screenPhysical therapy/Occupational therapyInpatient rehab if recommended by physical/occupational therapy    DVT Prophylaxis :  Choice of Primary Team    Disposition  :  No further recommendationsOutpatient Neurology follow up in 3-6 weeks    Subjective  Patient seen this afternoon. on BiPAP. No acute issues or concerns. Feels still having some trouble with speech pattern.       Examination  1A: Level of Consciousness - Alert; keenly responsive + 0  1B: Ask Month and Age - Both Questions Right + 0  1C: Blink Eyes & Squeeze Hands - Performs Both Tasks + 0  2: Test Horizontal Extraocular Movements - Normal + 0  3: Test Visual Fields - No Visual Loss + 0  4: Test Facial Palsy (Use Grimace if Obtunded) - Normal symmetry + 0  5A: Test Left Arm Motor Drift - No Drift for 10 Seconds + 0  5B: Test Right Arm Motor Drift - No Drift for 10 Seconds + 0  6A: Test Left Leg Motor Drift - No Drift for 5 Seconds + 0  6B: Test Right Leg Motor Drift - Drift, but doesn't hit bed + 1  7: Test Limb Ataxia (FNF/Heel-Shin) - No Ataxia + 0  8: Test Sensation - Normal; No sensory loss + 0  9: Test Language/Aphasia - Normal; No aphasia + 0  10: Test Dysarthria - Normal + 0  11: Test Extinction/Inattention - No abnormality + 0    NIHSS Score: 1           This consult was conducted in real time using interactive audio and video technology. Patient was informed of the technology being used for this visit and agreed to proceed. Patient located in hospital and provider located at home/office setting.    Telehealth Neurology consultation was provided. I spent minutes providing telehealth care. This includes time spent for face to face visit via telemedicine, review of medical records, imaging studies and discussion of findings with providers, the patient and/or family.      Dr Gamal Mendoza      [facility]  For Inpatient follow-up with [facility] physician please call [facility] at [phone]. As we are not an outpatient service for any post hospital discharge needs please contact the hospital for assistance.  If you have any questions for the [facility] physicians or need to reconsult for clinical  or diagnostic changes please contact us via [facility] at [phone]

## 2025-02-13 NOTE — H&P
Patient Care Team:  Joaquim Portillo MD as PCP - General (Internal Medicine)    Chief complaint strokelike symptoms    Subjective     Patient is a 74 y.o. male presents with slurring of speech, headache and altered mental status.  He also apparently had leg weakness.  Last known normal was the night prior.  Patient's daughter spoke to him around 1:30 in the afternoon and reported that he seemed confused and had slurred speech.  He did not present to the emergency department until the evening.  His primary complaint is a severe headache that started earlier in the day.  He does not get headaches on a normal basis.  He is on Eliquis for A-fib.  He was evaluated by teleneurology and also had stroke CT package that did not show acute intracranial pathology.    Patient's main complaint is SOA. He is on bumex at home and states that lately he has had difficulty breathing.     Review of Systems   Pertinent items are noted in HPI    History  Past Medical History:   Diagnosis Date    Arthritis     CHF (congestive heart failure)     Coronary artery disease     Diabetes mellitus     Hyperlipidemia     Hypertension     Sleep apnea      Past Surgical History:   Procedure Laterality Date    COLONOSCOPY  2017    COLONOSCOPY N/A 3/28/2022    Procedure: COLONOSCOPY WITH POLYPECTOMIES;  Surgeon: Tristan Cooper MD;  Location: [facility] ENDOSCOPY;  Service: Gastroenterology;  Laterality: N/A;  COLON POLYPS    TONSILLECTOMY       No family history on file.  Social History     Tobacco Use    Smoking status: Every Day     Current packs/day: 0.25     Average packs/day: 0.3 packs/day for 5.0 years (1.3 ttl pk-yrs)     Types: Cigars, Cigarettes    Smokeless tobacco: Former   Vaping Use    Vaping status: Never Used   Substance Use Topics    Alcohol use: Not Currently     Comment: OCCASIONAL    Drug use: Defer     (Not in a hospital admission)   Allergies:  Patient has no known allergies.    Objective     Vital Signs  Temp:  [98.4 °F  (36.9 °C)] 98.4 °F (36.9 °C)  Heart Rate:  [114-120] 114  Resp:  [28] 28  BP: (159)/(103) 159/103    Physical Exam:      General Appearance:  Alert, cooperative, in no acute distress   Head:  Normocephalic, without obvious abnormality, atraumatic   Eyes:  Lids and lashes normal, conjunctivae and sclerae normal, no icterus, no pallor, corneas clear, PERRLA   Ears:  Ears appear intact with no abnormalities noted   Throat:  No oral lesions, no thrush, oral mucosa moist   Neck:  No adenopathy, supple, trachea midline, no thyromegaly, no carotid bruit, no JVD   Back:  No kyphosis present, no scoliosis present, no skin lesions, erythema or scars, no tenderness to percussion or palpation, range of motion normal   Lungs:  Clear to auscultation, respirations regular, even and unlabored    Heart:  Regular rhythm and normal rate, normal S1 and S2, no murmur, no gallop, no rub, no click   Chest Wall:  No abnormalities observed   Abdomen:  Normal bowel sounds, no masses, no organomegaly, soft non-tender, non-distended, no guarding, no rebound tenderness   Rectal:  Deferred   Extremities:  Moves all extremities well, no edema, no cyanosis, no redness   Pulses:  Pulses palpable and equal bilaterally   Skin:  No bleeding, bruising or rash   Lymph nodes:  No palpable adenopathy   Neurologic:  Cranial nerves 2 - 12 grossly intact, sensation intact, DTR present and equal bilaterally       Results Review:    I reviewed the patient's new clinical results.  I reviewed the patient's new imaging results and agree with the interpretation.  I reviewed the patient's other test results and agree with the interpretation  I personally viewed and interpreted the patient's EKG/Telemetry data    Assessment & Plan       CVA (cerebral vascular accident)    Pleural effusion    Intractable headache    Essential hypertension    Hyperlipidemia    Type 2 diabetes mellitus with hyperglycemia  Acute Chf exacerbation    Admit to PCU under hospitalist  service  Neurochecks  Aspirin, statin  MRI head without contrast  PT OT speech  SCDs  Continue Eliquis  Maintain euglycemia and avoid hyperthermia  Permissive hypertension to 220/120  Supportive care  Dysphagia screen  IV diuresis  Strict I&O  Echo  Full code        Usama Paige MD  02/12/25  21:51 EST

## 2025-02-13 NOTE — THERAPY EVALUATION
Acute Care - Physical Therapy Initial Evaluation   [place]     Patient Name: Avila Parrish  : 1950  MRN: [id number]  Today's Date: 2025      Visit Dx:     ICD-10-CM ICD-9-CM   1. Cerebrovascular accident (CVA), unspecified mechanism  I63.9 434.91   2. Acute intractable headache, unspecified headache type  R51.9 784.0   3. Pleural effusion, right  J90 511.9   4. Decreased activities of daily living (ADL)  Z78.9 V49.89   5. Difficulty in walking  R26.2 719.7     Patient Active Problem List   Diagnosis    Colon cancer screening    CVA (cerebral vascular accident)    Pleural effusion    Intractable headache    Essential hypertension    Hyperlipidemia    Type 2 diabetes mellitus with hyperglycemia     Past Medical History:   Diagnosis Date    Arthritis     CHF (congestive heart failure)     Coronary artery disease     Diabetes mellitus     Hyperlipidemia     Hypertension     Sleep apnea      Past Surgical History:   Procedure Laterality Date    COLONOSCOPY      COLONOSCOPY N/A 3/28/2022    Procedure: COLONOSCOPY WITH POLYPECTOMIES;  Surgeon: Tristan Cooper MD;  Location: [facility] ENDOSCOPY;  Service: Gastroenterology;  Laterality: N/A;  COLON POLYPS    TONSILLECTOMY       PT Assessment (Last 12 Hours)       PT Evaluation and Treatment       Row Name 25 1000          Physical Therapy Time and Intention    Subjective Information complains of;weakness;fatigue (P)   -TM     Document Type evaluation (P)   -TM     Mode of Treatment individual therapy (P)   -TM     Patient Effort good (P)   -TM     Symptoms Noted During/After Treatment none (P)   -TM       Row Name 25 1000          General Information    Prior Level of Function independent: (P)   -TM       Row Name 25 1000          Living Environment    Current Living Arrangements home (P)   -TM     People in Home alone (P)   -TM     Primary Care Provided by self (P)   -TM       Row Name 25 1000          Range of Motion (ROM)     Range of Motion ROM is WNL;bilateral lower extremities (P)   -       Row Name 02/13/25 1000          Strength (Manual Muscle Testing)    Strength (Manual Muscle Testing) strength is WNL;bilateral lower extremities (P)   -       Row Name 02/13/25 1000          Bed Mobility    Bed Mobility supine-sit (P)   -TM     Supine-Sit [place] (Bed Mobility) 1 person assist;minimum assist (75% patient effort) (P)   -       Row Name 02/13/25 1000          Sit-Stand Transfer    Sit-Stand [place] (Transfers) contact guard (P)   -TM     Assistive Device (Sit-Stand Transfers) walker, front-wheeled (P)   -TM       Row Name 02/13/25 1000          Stand-Sit Transfer    Assistive Device (Stand-Sit Transfers) walker, front-wheeled (P)   -       Row Name 02/13/25 1000          Toilet Transfer    [place] Level (Toilet Transfer) contact guard (P)   -TM     Assistive Device (Toilet Transfer) walker, front-wheeled (P)   -       Row Name 02/13/25 1000          Gait/Stairs (Locomotion)    Gait/Stairs Locomotion gait/ambulation independence;gait/ambulation assistive device (P)   -TM     [place] Level (Gait) contact guard (P)   -TM     Assistive Device (Gait) walker, front-wheeled (P)   -TM     Patient was able to Ambulate yes (P)   -TM     Distance in Feet (Gait) 30 (P)   -       Row Name 02/13/25 1000          Safety Issues/Impairments Affecting Functional Mobility    Impairments Affecting Function (Mobility) balance;endurance/activity tolerance;strength (P)   -       Row Name 02/13/25 1000          Balance    Balance Assessment standing dynamic balance (P)   -TM     Sit to Stand Dynamic Balance contact guard (P)   -       Row Name             Wound 02/13/25 0150 Right lower leg    Wound - Properties Group Placement Date: 02/13/25  -RF Placement Time: 0150  -RF Side: Right  -RF Orientation: lower  -RF Location: leg  -RF Present on Original Admission: Y  -RF    Retired Wound - Properties Group Placement  Date: 02/13/25  -RF Placement Time: 0150  -RF Present on Original Admission: Y  -RF Side: Right  -RF Orientation: lower  -RF Location: leg  -RF    Retired Wound - Properties Group Placement Date: 02/13/25  -RF Placement Time: 0150  -RF Present on Original Admission: Y  -RF Side: Right  -RF Orientation: lower  -RF Location: leg  -RF    Retired Wound - Properties Group Date first assessed: 02/13/25  -RF Time first assessed: 0150  -RF Present on Original Admission: Y  -RF Side: Right  -RF Location: leg  -RF      Row Name             Wound 02/13/25 0150 Left lower leg    Wound - Properties Group Placement Date: 02/13/25  -RF Placement Time: 0150  -RF Side: Left  -RF Orientation: lower  -RF Location: leg  -RF Present on Original Admission: Y  -RF    Retired Wound - Properties Group Placement Date: 02/13/25  -RF Placement Time: 0150  -RF Present on Original Admission: Y  -RF Side: Left  -RF Orientation: lower  -RF Location: leg  -RF    Retired Wound - Properties Group Placement Date: 02/13/25  -RF Placement Time: 0150  -RF Present on Original Admission: Y  -RF Side: Left  -RF Orientation: lower  -RF Location: leg  -RF    Retired Wound - Properties Group Date first assessed: 02/13/25  -RF Time first assessed: 0150  -RF Present on Original Admission: Y  -RF Side: Left  -RF Location: leg  -RF      Row Name             Wound 02/13/25 0150 Right anterior third toe    Wound - Properties Group Placement Date: 02/13/25  -RF Placement Time: 0150  -RF Side: Right  -RF Orientation: anterior  -RF Location: third toe  -RF Present on Original Admission: Y  -RF    Retired Wound - Properties Group Placement Date: 02/13/25  -RF Placement Time: 0150  -RF Present on Original Admission: Y  -RF Side: Right  -RF Orientation: anterior  -RF Location: third toe  -RF    Retired Wound - Properties Group Placement Date: 02/13/25  -RF Placement Time: 0150  -RF Present on Original Admission: Y  -RF Side: Right  -RF Orientation: anterior  -RF Location:  third toe  -RF    Retired Wound - Properties Group Date first assessed: 02/13/25  -RF Time first assessed: 0150  -RF Present on Original Admission: Y  -RF Side: Right  -RF Location: third toe  -RF      Row Name             Wound 02/13/25 0150 Bilateral gluteal    Wound - Properties Group Placement Date: 02/13/25  -RF Placement Time: 0150  -RF Side: Bilateral  -RF Location: gluteal  -RF Present on Original Admission: Y  -RF    Retired Wound - Properties Group Placement Date: 02/13/25  -RF Placement Time: 0150  -RF Present on Original Admission: Y  -RF Side: Bilateral  -RF Location: gluteal  -RF    Retired Wound - Properties Group Placement Date: 02/13/25  -RF Placement Time: 0150  -RF Present on Original Admission: Y  -RF Side: Bilateral  -RF Location: gluteal  -RF    Retired Wound - Properties Group Date first assessed: 02/13/25  -RF Time first assessed: 0150  -RF Present on Original Admission: Y  -RF Side: Bilateral  -RF Location: gluteal  -RF      Row Name 02/13/25 1000          Plan of Care Review    Plan of Care Reviewed With patient (P)   -TM     Outcome Evaluation Pt presents with deficits in balance and endurance and is unalke to safely ambulate. Skilled therapy services is required (P)   -TM       Row Name 02/13/25 1000          Therapy Assessment/Plan (PT)    Patient/Family Therapy Goals Statement (PT) return home safely (P)   -TM     Rehab Potential (PT) good (P)   -TM     Criteria for Skilled Interventions Met (PT) skilled treatment is necessary (P)   -TM     Therapy Frequency (PT) daily (P)   -TM     Predicted Duration of Therapy Intervention (PT) 10 days (P)   -TM     Problem List (PT) problems related to;balance;mobility;strength (P)   -TM     Activity Limitations Related to Problem List (PT) unable to ambulate safely (P)   -TM       Row Name 02/13/25 1000          PT Evaluation Complexity    History, PT Evaluation Complexity no personal factors and/or comorbidities (P)   -TM     Examination of Body  Systems (PT Eval Complexity) total of 4 or more elements (P)   -TM     Clinical Presentation (PT Evaluation Complexity) stable (P)   -TM     Clinical Decision Making (PT Evaluation Complexity) low complexity (P)   -TM     Overall Complexity (PT Evaluation Complexity) low complexity (P)   -[facility] Name 02/13/25 1000          Therapy Plan Review/Discharge Plan (PT)    Therapy Plan Review (PT) evaluation/treatment results reviewed;patient (P)   -[facility] Name 02/13/25 1000          Physical Therapy Goals    Bed Mobility Goal Selection (PT) bed mobility, PT goal 1 (P)   -TM     Transfer Goal Selection (PT) transfer, PT goal 1 (P)   -TM     Gait Training Goal Selection (PT) gait training, PT goal 1 (P)   -TM     Balance Goal Selection (PT) balance, PT goal 1 (P)   -TM       Row Name 02/13/25 1000          Bed Mobility Goal 1 (PT)    Activity/Assistive Device (Bed Mobility Goal 1, PT) supine to sit (P)   -TM     [place] Level/Cues Needed (Bed Mobility Goal 1, PT) independent (P)   -TM     Time Frame (Bed Mobility Goal 1, PT) 10 days (P)   -[facility] Name 02/13/25 1000          Transfer Goal 1 (PT)    Activity/Assistive Device (Transfer Goal 1, PT) transfers, all (P)   -TM     [place] Level/Cues Needed (Transfer Goal 1, PT) independent (P)   -TM     Time Frame (Transfer Goal 1, PT) 10 days (P)   -[facility] Name 02/13/25 1000          Gait Training Goal 1 (PT)    Activity/Assistive Device (Gait Training Goal 1, PT) gait (walking locomotion);assistive device use (P)   -TM     [place] Level (Gait Training Goal 1, PT) independent (P)   -TM     Distance (Gait Training Goal 1, PT) 300' (P)   -TM     Time Frame (Gait Training Goal 1, PT) 10 days (P)   -TM       [facility] Name 02/13/25 1000          Balance Goal 1 (PT)    Activity/Assistive Device (Balance Goal) standing dynamic balance (P)   -TM     [place] Level/Cues Needed (Balance Goal 1, PT) independent (P)   -TM     Time Frame (Balance Goal 1,  PT) 2 weeks (P)   -TM               User Key  (r) = Recorded By, (t) = Taken By, (c) = Cosigned By      Initials Name Provider Type    Sarai Chávez RN Registered Nurse    Reed Aguilera, PT Student PT Student                    Physical Therapy Education       Title: PT OT SLP Therapies (In Progress)       Topic: Physical Therapy (Not Started)       Point: Mobility training (Not Started)       Learner Progress:  Not documented in this visit.              Point: Home exercise program (Not Started)       Learner Progress:  Not documented in this visit.              Point: Body mechanics (Not Started)       Learner Progress:  Not documented in this visit.              Point: Precautions (Not Started)       Learner Progress:  Not documented in this visit.                                  PT Recommendation and Plan  Anticipated Discharge Disposition (PT): (P) inpatient rehabilitation facility  Planned Therapy Interventions (PT): (P) balance training, bed mobility training, gait training, home exercise program, neuromuscular re-education, transfer training, strengthening  Therapy Frequency (PT): (P) daily  Plan of Care Reviewed With: (P) patient  Outcome Evaluation: (P) Pt presents with deficits in balance and endurance and is unalke to safely ambulate. Skilled therapy services is required   Outcome Measures       Row Name 02/13/25 1000             How much help from another person do you currently need...    Turning from your back to your side while in flat bed without using bedrails? 3 (P)   -TM      Moving from lying on back to sitting on the side of a flat bed without bedrails? 3 (P)   -TM      Moving to and from a bed to a chair (including a wheelchair)? 3 (P)   -TM      Standing up from a chair using your arms (e.g., wheelchair, bedside chair)? 3 (P)   -TM      Climbing 3-5 steps with a railing? 2 (P)   -TM      To walk in hospital room? 3 (P)   -TM      AM-PAC 6 Clicks Score (PT) 17 (P)   -TM          Functional Assessment    Outcome Measure Options AM-PAC 6 Clicks Basic Mobility (PT) (P)   -TM                User Key  (r) = Recorded By, (t) = Taken By, (c) = Cosigned By      Initials Name Provider Type    TM Reed Garay PT Student PT Student                     Time Calculation:    PT Charges       Row Name 02/13/25 1024             Time Calculation    PT Received On 02/13/25 (P)   -TM      PT Goal Re-Cert Due Date 02/22/25 (P)   -TM         Untimed Charges    PT Eval/Re-eval Minutes 35 (P)   -TM         Total Minutes    Untimed Charges Total Minutes 35 (P)   -TM       Total Minutes 35 (P)   -TM                User Key  (r) = Recorded By, (t) = Taken By, (c) = Cosigned By      Initials Name Provider Type    Reed Aguilera PT Student PT Student                  Therapy Charges for Today       Code Description Service Date Service Provider Modifiers Qty    20100284778 HC PT EVAL LOW COMPLEXITY 3 2/13/2025 Reed Garay PT Student GP 1            PT G-Codes  Outcome Measure Options: (P) AM-PAC 6 Clicks Basic Mobility (PT)  AM-PAC 6 Clicks Score (PT): (P) 17  AM-PAC 6 Clicks Score (OT): 18    Reed Garay PT Student  2/13/2025

## 2025-02-14 ENCOUNTER — APPOINTMENT (OUTPATIENT)
Dept: CT IMAGING | Facility: HOSPITAL | Age: 75
DRG: 291 | End: 2025-02-14
Payer: OTHER GOVERNMENT

## 2025-02-14 LAB
ANION GAP SERPL CALCULATED.3IONS-SCNC: 10.7 MMOL/L (ref 5–15)
BASOPHILS # BLD AUTO: 0.01 10*3/MM3 (ref 0–0.2)
BASOPHILS NFR BLD AUTO: 0.2 % (ref 0–1.5)
BUN SERPL-MCNC: 30 MG/DL (ref 8–23)
BUN/CREAT SERPL: 12.2 (ref 7–25)
CALCIUM SPEC-SCNC: 8.5 MG/DL (ref 8.6–10.5)
CHLORIDE SERPL-SCNC: 101 MMOL/L (ref 98–107)
CO2 SERPL-SCNC: 22.3 MMOL/L (ref 22–29)
CREAT SERPL-MCNC: 2.46 MG/DL (ref 0.76–1.27)
DEPRECATED RDW RBC AUTO: 48.6 FL (ref 37–54)
EGFRCR SERPLBLD CKD-EPI 2021: 26.5 ML/MIN/1.73
EOSINOPHIL # BLD AUTO: 0.01 10*3/MM3 (ref 0–0.4)
EOSINOPHIL NFR BLD AUTO: 0.2 % (ref 0.3–6.2)
ERYTHROCYTE [DISTWIDTH] IN BLOOD BY AUTOMATED COUNT: 15.4 % (ref 12.3–15.4)
GLUCOSE BLDC GLUCOMTR-MCNC: 147 MG/DL (ref 70–99)
GLUCOSE BLDC GLUCOMTR-MCNC: 172 MG/DL (ref 70–99)
GLUCOSE BLDC GLUCOMTR-MCNC: 186 MG/DL (ref 70–99)
GLUCOSE BLDC GLUCOMTR-MCNC: 211 MG/DL (ref 70–99)
GLUCOSE SERPL-MCNC: 153 MG/DL (ref 65–99)
HCT VFR BLD AUTO: 39.1 % (ref 37.5–51)
HGB BLD-MCNC: 12.7 G/DL (ref 13–17.7)
IMM GRANULOCYTES # BLD AUTO: 0.05 10*3/MM3 (ref 0–0.05)
IMM GRANULOCYTES NFR BLD AUTO: 1.1 % (ref 0–0.5)
LYMPHOCYTES # BLD AUTO: 0.65 10*3/MM3 (ref 0.7–3.1)
LYMPHOCYTES NFR BLD AUTO: 13.8 % (ref 19.6–45.3)
MCH RBC QN AUTO: 27.9 PG (ref 26.6–33)
MCHC RBC AUTO-ENTMCNC: 32.5 G/DL (ref 31.5–35.7)
MCV RBC AUTO: 85.9 FL (ref 79–97)
MONOCYTES # BLD AUTO: 0.56 10*3/MM3 (ref 0.1–0.9)
MONOCYTES NFR BLD AUTO: 11.9 % (ref 5–12)
NEUTROPHILS NFR BLD AUTO: 3.44 10*3/MM3 (ref 1.7–7)
NEUTROPHILS NFR BLD AUTO: 72.8 % (ref 42.7–76)
NRBC BLD AUTO-RTO: 0 /100 WBC (ref 0–0.2)
PLATELET # BLD AUTO: 102 10*3/MM3 (ref 140–450)
PMV BLD AUTO: 11.5 FL (ref 6–12)
POTASSIUM SERPL-SCNC: 3.5 MMOL/L (ref 3.5–5.2)
RBC # BLD AUTO: 4.55 10*6/MM3 (ref 4.14–5.8)
RBC MORPH BLD: NORMAL
SMALL PLATELETS BLD QL SMEAR: NORMAL
SODIUM SERPL-SCNC: 134 MMOL/L (ref 136–145)
WBC MORPH BLD: NORMAL
WBC NRBC COR # BLD AUTO: 4.72 10*3/MM3 (ref 3.4–10.8)

## 2025-02-14 PROCEDURE — 82948 REAGENT STRIP/BLOOD GLUCOSE: CPT

## 2025-02-14 PROCEDURE — 94799 UNLISTED PULMONARY SVC/PX: CPT

## 2025-02-14 PROCEDURE — 71250 CT THORAX DX C-: CPT

## 2025-02-14 PROCEDURE — 94761 N-INVAS EAR/PLS OXIMETRY MLT: CPT

## 2025-02-14 PROCEDURE — 97530 THERAPEUTIC ACTIVITIES: CPT

## 2025-02-14 PROCEDURE — 80048 BASIC METABOLIC PNL TOTAL CA: CPT | Performed by: STUDENT IN AN ORGANIZED HEALTH CARE EDUCATION/TRAINING PROGRAM

## 2025-02-14 PROCEDURE — 85007 BL SMEAR W/DIFF WBC COUNT: CPT | Performed by: STUDENT IN AN ORGANIZED HEALTH CARE EDUCATION/TRAINING PROGRAM

## 2025-02-14 PROCEDURE — 85025 COMPLETE CBC W/AUTO DIFF WBC: CPT | Performed by: STUDENT IN AN ORGANIZED HEALTH CARE EDUCATION/TRAINING PROGRAM

## 2025-02-14 PROCEDURE — 97116 GAIT TRAINING THERAPY: CPT

## 2025-02-14 PROCEDURE — 82948 REAGENT STRIP/BLOOD GLUCOSE: CPT | Performed by: PHYSICIAN ASSISTANT

## 2025-02-14 PROCEDURE — 63710000001 INSULIN LISPRO (HUMAN) PER 5 UNITS: Performed by: PHYSICIAN ASSISTANT

## 2025-02-14 PROCEDURE — 99232 SBSQ HOSP IP/OBS MODERATE 35: CPT | Performed by: STUDENT IN AN ORGANIZED HEALTH CARE EDUCATION/TRAINING PROGRAM

## 2025-02-14 PROCEDURE — 63710000001 INSULIN GLARGINE PER 5 UNITS: Performed by: STUDENT IN AN ORGANIZED HEALTH CARE EDUCATION/TRAINING PROGRAM

## 2025-02-14 RX ORDER — BUMETANIDE 0.25 MG/ML
1 INJECTION, SOLUTION INTRAMUSCULAR; INTRAVENOUS
Status: DISCONTINUED | OUTPATIENT
Start: 2025-02-14 | End: 2025-02-18 | Stop reason: HOSPADM

## 2025-02-14 RX ADMIN — ATORVASTATIN CALCIUM 80 MG: 40 TABLET, FILM COATED ORAL at 20:22

## 2025-02-14 RX ADMIN — INSULIN LISPRO 2 UNITS: 100 INJECTION, SOLUTION INTRAVENOUS; SUBCUTANEOUS at 12:44

## 2025-02-14 RX ADMIN — METOPROLOL TARTRATE 100 MG: 50 TABLET, FILM COATED ORAL at 20:22

## 2025-02-14 RX ADMIN — WHITE PETROLATUM 1 APPLICATION: 1.75 OINTMENT TOPICAL at 11:38

## 2025-02-14 RX ADMIN — APIXABAN 5 MG: 5 TABLET, FILM COATED ORAL at 08:41

## 2025-02-14 RX ADMIN — INSULIN LISPRO 4 UNITS: 100 INJECTION, SOLUTION INTRAVENOUS; SUBCUTANEOUS at 16:42

## 2025-02-14 RX ADMIN — INSULIN LISPRO 2 UNITS: 100 INJECTION, SOLUTION INTRAVENOUS; SUBCUTANEOUS at 20:21

## 2025-02-14 RX ADMIN — TAMSULOSIN HYDROCHLORIDE 0.8 MG: 0.4 CAPSULE ORAL at 20:22

## 2025-02-14 RX ADMIN — FINASTERIDE 5 MG: 5 TABLET, FILM COATED ORAL at 08:41

## 2025-02-14 RX ADMIN — INSULIN GLARGINE 10 UNITS: 100 INJECTION, SOLUTION SUBCUTANEOUS at 20:22

## 2025-02-14 RX ADMIN — Medication 10 ML: at 08:41

## 2025-02-14 RX ADMIN — Medication 10 ML: at 20:22

## 2025-02-14 RX ADMIN — ACETAMINOPHEN 650 MG: 325 TABLET ORAL at 20:22

## 2025-02-14 RX ADMIN — METOPROLOL TARTRATE 100 MG: 50 TABLET, FILM COATED ORAL at 08:41

## 2025-02-14 RX ADMIN — ASPIRIN 81 MG: 81 TABLET, CHEWABLE ORAL at 08:41

## 2025-02-14 NOTE — THERAPY TREATMENT NOTE
Acute Care - Physical Therapy Treatment Note   [place]     Patient Name: Avila Parrish  : 1950  MRN: [id number]  Today's Date: 2025      Visit Dx:     ICD-10-CM ICD-9-CM   1. Cerebrovascular accident (CVA), unspecified mechanism  I63.9 434.91   2. Acute intractable headache, unspecified headache type  R51.9 784.0   3. Pleural effusion, right  J90 511.9   4. Decreased activities of daily living (ADL)  Z78.9 V49.89   5. Difficulty in walking  R26.2 719.7     Patient Active Problem List   Diagnosis    Colon cancer screening    CVA (cerebral vascular accident)    Pleural effusion    Intractable headache    Essential hypertension    Hyperlipidemia    Type 2 diabetes mellitus with hyperglycemia     Past Medical History:   Diagnosis Date    Arthritis     CHF (congestive heart failure)     Coronary artery disease     Diabetes mellitus     Hyperlipidemia     Hypertension     Sleep apnea      Past Surgical History:   Procedure Laterality Date    COLONOSCOPY      COLONOSCOPY N/A 3/28/2022    Procedure: COLONOSCOPY WITH POLYPECTOMIES;  Surgeon: Tristan Cooper MD;  Location: [facility] ENDOSCOPY;  Service: Gastroenterology;  Laterality: N/A;  COLON POLYPS    TONSILLECTOMY       PT Assessment (Last 12 Hours)       PT Evaluation and Treatment       Row Name 25 1200          Physical Therapy Time and Intention    Subjective Information no complaints  -SM     Document Type therapy note (daily note)  -SM     Mode of Treatment individual therapy;physical therapy  -SM     Patient Effort good  -SM     Symptoms Noted During/After Treatment fatigue  -SM       Row Name 25 1200          Sit-Stand Transfer    Sit-Stand [place] (Transfers) contact guard  -     Assistive Device (Sit-Stand Transfers) walker, front-wheeled  -SM       Row Name 25 1200          Stand-Sit Transfer    Stand-Sit [place] (Transfers) contact guard  -     Assistive Device (Stand-Sit Transfers) walker,  front-wheeled  -       Row Name 02/14/25 1200          Toilet Transfer    Type (Toilet Transfer) sit-stand;stand-sit  -     [place] Level (Toilet Transfer) contact guard  -     Assistive Device (Toilet Transfer) walker, front-wheeled  -SM       Row Name 02/14/25 1200          Gait/Stairs (Locomotion)    Gait/Stairs Locomotion gait/ambulation assistive device  -     [place] Level (Gait) contact guard  -     Assistive Device (Gait) walker, front-wheeled  -     Patient was able to Ambulate yes  -     Distance in Feet (Gait) 50  -SM     Deviations/Abnormal Patterns (Gait) [place] decreased;gait speed decreased  -     Bilateral Gait Deviations forward flexed posture  -       Row Name 02/14/25 1200          Safety Issues/Impairments Affecting Functional Mobility    Impairments Affecting Function (Mobility) balance;endurance/activity tolerance;strength  -       Row Name 02/14/25 1200          Balance    Dynamic Standing Balance contact guard  -     Position/Device Used, Standing Balance walker, front-wheeled  -       Row Name             Wound 02/13/25 0150 Right lower leg    Wound - Properties Group Placement Date: 02/13/25  -RF Placement Time: 0150  -RF Side: Right  -RF Orientation: lower  -RF Location: leg  -RF Present on Original Admission: Y  -RF    Retired Wound - Properties Group Placement Date: 02/13/25  -RF Placement Time: 0150  -RF Present on Original Admission: Y  -RF Side: Right  -RF Orientation: lower  -RF Location: leg  -RF    Retired Wound - Properties Group Placement Date: 02/13/25  -RF Placement Time: 0150  -RF Present on Original Admission: Y  -RF Side: Right  -RF Orientation: lower  -RF Location: leg  -RF    Retired Wound - Properties Group Date first assessed: 02/13/25  -RF Time first assessed: 0150  -RF Present on Original Admission: Y  -RF Side: Right  -RF Location: leg  -RF      Row Name             Wound 02/13/25 0150 Left lower leg    Wound - Properties Group  Placement Date: 02/13/25  -RF Placement Time: 0150  -RF Side: Left  -RF Orientation: lower  -RF Location: leg  -RF Present on Original Admission: Y  -RF    Retired Wound - Properties Group Placement Date: 02/13/25  -RF Placement Time: 0150  -RF Present on Original Admission: Y  -RF Side: Left  -RF Orientation: lower  -RF Location: leg  -RF    Retired Wound - Properties Group Placement Date: 02/13/25  -RF Placement Time: 0150  -RF Present on Original Admission: Y  -RF Side: Left  -RF Orientation: lower  -RF Location: leg  -RF    Retired Wound - Properties Group Date first assessed: 02/13/25  -RF Time first assessed: 0150  -RF Present on Original Admission: Y  -RF Side: Left  -RF Location: leg  -RF      Row Name             Wound 02/13/25 0150 Right anterior third toe    Wound - Properties Group Placement Date: 02/13/25  -RF Placement Time: 0150  -RF Side: Right  -RF Orientation: anterior  -RF Location: third toe  -RF Present on Original Admission: Y  -RF    Retired Wound - Properties Group Placement Date: 02/13/25  -RF Placement Time: 0150  -RF Present on Original Admission: Y  -RF Side: Right  -RF Orientation: anterior  -RF Location: third toe  -RF    Retired Wound - Properties Group Placement Date: 02/13/25  -RF Placement Time: 0150  -RF Present on Original Admission: Y  -RF Side: Right  -RF Orientation: anterior  -RF Location: third toe  -RF    Retired Wound - Properties Group Date first assessed: 02/13/25  -RF Time first assessed: 0150  -RF Present on Original Admission: Y  -RF Side: Right  -RF Location: third toe  -RF      Row Name             Wound 02/13/25 0150 Bilateral gluteal    Wound - Properties Group Placement Date: 02/13/25  -RF Placement Time: 0150  -RF Side: Bilateral  -RF Location: gluteal  -RF Present on Original Admission: Y  -RF    Retired Wound - Properties Group Placement Date: 02/13/25  -RF Placement Time: 0150  -RF Present on Original Admission: Y  -RF Side: Bilateral  -RF Location: gluteal   -RF    Retired Wound - Properties Group Placement Date: 02/13/25  -RF Placement Time: 0150  -RF Present on Original Admission: Y  -RF Side: Bilateral  -RF Location: gluteal  -RF    Retired Wound - Properties Group Date first assessed: 02/13/25  -RF Time first assessed: 0150  -RF Present on Original Admission: Y  -RF Side: Bilateral  -RF Location: gluteal  -RF      Row Name 02/14/25 1200          Vital Signs    O2 Delivery Intra Treatment nasal cannula  2L  -SM       Row Name 02/14/25 1200          Positioning and Restraints    Pre-Treatment Position sitting in chair/recliner  -SM     Post Treatment Position chair  -SM     In Chair reclined;call light within reach;encouraged to call for assist;exit alarm on;legs elevated;heels elevated;with nsg  -SM       Row Name 02/14/25 1200          Progress Summary (PT)    Progress Toward Functional Goals (PT) progress toward functional goals is good  -               User Key  (r) = Recorded By, (t) = Taken By, (c) = Cosigned By      Initials Name Provider Type    Aylin Rosado PTA Physical Therapist Assistant    Sarai Chávez RN Registered Nurse                    Physical Therapy Education       Title: PT OT SLP Therapies (In Progress)       Topic: Physical Therapy (Not Started)       Point: Mobility training (Not Started)       Learner Progress:  Not documented in this visit.              Point: Home exercise program (Not Started)       Learner Progress:  Not documented in this visit.              Point: Body mechanics (Not Started)       Learner Progress:  Not documented in this visit.              Point: Precautions (Not Started)       Learner Progress:  Not documented in this visit.                                  PT Recommendation and Plan     Progress Summary (PT)  Progress Toward Functional Goals (PT): progress toward functional goals is good   Outcome Measures       Row Name 02/14/25 1250 02/13/25 1000          How much help from another person do you  currently need...    Turning from your back to your side while in flat bed without using bedrails? 4  -SM 3  -LEANNE (r) TM (t) LEANNE (c)     Moving from lying on back to sitting on the side of a flat bed without bedrails? 3  -SM 3  -LEANNE (r) TM (t) LEANNE (c)     Moving to and from a bed to a chair (including a wheelchair)? 3  -SM 3  -LEANNE (r) TM (t) LEANNE (c)     Standing up from a chair using your arms (e.g., wheelchair, bedside chair)? 4  -SM 3  -LEANNE (r) TM (t) LEANNE (c)     Climbing 3-5 steps with a railing? 3  -SM 2  -LEANNE (r) TM (t) LEANNE (c)     To walk in hospital room? 3  -SM 3  -LEANNE (r) TM (t) LEANNE (c)     AM-PAC 6 Clicks Score (PT) 20  -SM 17  -LEANNE (r) TM (t)        Functional Assessment    Outcome Measure Options -- AM-PAC 6 Clicks Basic Mobility (PT)  -LEANNE (r) TM (t) LEANNE (c)               User Key  (r) = Recorded By, (t) = Taken By, (c) = Cosigned By      Initials Name Provider Type    LEANNECarlos Alberto Goodson, PT Physical Therapist    Aylin Rosado PTA Physical Therapist Assistant    Reed Aguilrea, PT Student PT Student                     Time Calculation:    PT Charges       Row Name 02/14/25 1244             Time Calculation    PT Received On 02/14/25  -         Timed Charges    20546 - Gait Training Minutes  15  -SM      67408 - PT Therapeutic Activity Minutes 25  -SM         Total Minutes    Timed Charges Total Minutes 40  -SM       Total Minutes 40  -SM                User Key  (r) = Recorded By, (t) = Taken By, (c) = Cosigned By      Initials Name Provider Type    Aylin Rosado PTA Physical Therapist Assistant                      PT G-Codes  Outcome Measure Options: AM-PAC 6 Clicks Basic Mobility (PT)  AM-PAC 6 Clicks Score (PT): 20  AM-PAC 6 Clicks Score (OT): 18    Aylin Coronel PTA  2/14/2025

## 2025-02-14 NOTE — PROGRESS NOTES
[facility]   Hospitalist Progress Note  Date: 2025  Patient Name: Avila Parrish  : 1950  MRN: [id number]  Date of admission: 2025  Room/Bed: 213/1      Subjective   Subjective     Chief Complaint:   Chief Complaint   Patient presents with    Headache    Altered Mental Status       Summary:     74 y.o. male who presented with chief complaint of shortness of breath.  He was also considering possible slurring of speech, headache and altered mental status.  He also apparently had leg weakness.  Last known normal was the night prior.  Patient's daughter spoke to him around 1:30 in the afternoon and reported that he seemed confused and had slurred speech.  He did not present to the emergency department until the evening.  His primary complaint is a severe headache that started earlier in the day.  He does not get headaches on a normal basis.  He is on Eliquis for A-fib.  He was evaluated by teleneurology and also had stroke CT package that did not show acute intracranial pathology.     Patient is on bumex at home and states that lately he has had difficulty breathing.     Interval Followup:     Patient underwent CTA head and neck, MRI brain without contrast and CT perfusion with and without contrast. MRI was negative for acute abnormalities.  Was noted to have incidental large right pleural effusion and neck CTA.  Patient oxygen weaned from 6 L nasal cannula to 1.5 with aggressive diuresis.  He has developed acute kidney injury.  On inquiry, patient states he had a pleural effusion 6 weeks ago which was drained by the [facility].  He denies having a history of pulmonary nodules before.      Objective   Objective     Vitals:   Temp:  [97.2 °F (36.2 °C)-99.1 °F (37.3 °C)] 97.9 °F (36.6 °C)  Heart Rate:  [] 81  Resp:  [18-20] 18  BP: ()/(61-83) 107/61  Flow (L/min) (Oxygen Therapy):  [1.5-2] 1.5    Physical Exam   General: Awake, alert, in no acute distress  HENT: Atraumatic,  normocephalic. Nasal cannula in place 1.5 L minute oxygen flow rate  Eyes: pupils equal, round, without scleral icterus  Cardiovascular: Regular rate and rhythm, no murmurs   Pulmonary: CTA bilaterally, no conversational dyspnea  Gastrointestinal: Soft nontender nondistended  Musculoskeletal: Lower extremity bilateral weeping ankle edema      Result Review    Result Review:  I have personally reviewed these results:  [x]  Laboratory      Lab 02/14/25 0421 02/12/25 1952   WBC 4.72 6.84   HEMOGLOBIN 12.7* 14.8   HEMATOCRIT 39.1 45.3   PLATELETS 102* 130*   NEUTROS ABS 3.44 5.42   IMMATURE GRANS (ABS) 0.05 0.06*   LYMPHS ABS 0.65* 0.71   MONOS ABS 0.56 0.63   EOS ABS 0.01 0.00   MCV 85.9 86.8   PROTIME  --  16.4*   APTT  --  34.1         Lab 02/14/25 0421 02/13/25 0513 02/12/25 1952   SODIUM 134*  --  138   POTASSIUM 3.5  --  4.0   CHLORIDE 101  --  102   CO2 22.3  --  22.9   ANION GAP 10.7  --  13.1   BUN 30*  --  15   CREATININE 2.46*  --  1.95*   EGFR 26.5*  --  35.0*   GLUCOSE 153*  --  150*   CALCIUM 8.5*  --  9.5   HEMOGLOBIN A1C  --  6.90*  --          Lab 02/12/25 1952   TOTAL PROTEIN 6.6   ALBUMIN 3.5   GLOBULIN 3.1   ALT (SGPT) 6   AST (SGOT) 16   BILIRUBIN 0.7   ALK PHOS 66         Lab 02/12/25 1952   PROTIME 16.4*   INR 1.27*         Lab 02/13/25 0513   CHOLESTEROL 172   LDL CHOL 106*   HDL CHOL 35*   TRIGLYCERIDES 179*         Lab 02/12/25 2032 02/12/25 1952   ABO TYPING O O   RH TYPING Negative Negative   ANTIBODY SCREEN  --  Negative         Brief Urine Lab Results  (Last result in the past 365 days)        Color   Clarity   Blood   Leuk Est   Nitrite   Protein   CREAT   Urine HCG        02/13/25 0309 Yellow   Clear   Moderate (2+)   Negative   Negative   >=300 mg/dL (3+)                 [x]  Microbiology   Microbiology Results (last 10 days)       ** No results found for the last 240 hours. **          [x]  Radiology  MRI Brain Without Contrast    Result Date: 2/13/2025  Impression: 1.No acute  intracranial abnormality. 2.Chronic microvascular ischemic changes. 3.Diffuse brain atrophy. Electronically Signed: Randall Hoang MD  2/13/2025 8:39 AM EST  Workstation ID: [id number]    CT Angiogram Head w AI Analysis of LVO    Result Date: 2/12/2025  Impression: No proximal large vessel occlusion or major stenosis of the anterior circulation. Diminutive appearing vertebrobasilar circulation with fetal-type bilateral PCAs, which appear grossly patent. The V4 segment of the right vertebral artery is diminutive and is not clearly visualized distally and in the basilar artery appears diminutive and does not appear convincingly opacified in its midportion, unclear whether this is secondary to diminutive vertebrobasilar circulation and/or multifocal severe stenosis/focal occlusions. Recommend correlation with patient's symptoms and consider MRI for further evaluation if clinically warranted. Large right pleural effusion. Right upper lobe pulmonary nodule measuring 2.1 cm. Although this may be infectious/inflammatory, malignancy is not excluded and dedicated chest CT can be considered for further evaluation. Electronically Signed: Elian Mccauley  2/12/2025 9:14 PM EST  Workstation ID: [id number]    CT Angiogram Neck    Result Date: 2/12/2025  Impression: No proximal large vessel occlusion or major stenosis of the anterior circulation. Diminutive appearing vertebrobasilar circulation with fetal-type bilateral PCAs, which appear grossly patent. The V4 segment of the right vertebral artery is diminutive and is not clearly visualized distally and in the basilar artery appears diminutive and does not appear convincingly opacified in its midportion, unclear whether this is secondary to diminutive vertebrobasilar circulation and/or multifocal severe stenosis/focal occlusions. Recommend correlation with patient's symptoms and consider MRI for further evaluation if clinically warranted. Large right pleural effusion. Right upper  lobe pulmonary nodule measuring 2.1 cm. Although this may be infectious/inflammatory, malignancy is not excluded and dedicated chest CT can be considered for further evaluation. Electronically Signed: Elian Keysneo  2/12/2025 9:14 PM EST  Workstation ID: [id number]    XR Chest 1 View    Result Date: 2/12/2025  Impression: Interstitial changes likely representing interstitial edema with small right pleural effusion Electronically Signed: Jesus Jack  2/12/2025 8:35 PM EST  Workstation ID: [id number]    CT CEREBRAL PERFUSION WITH & WITHOUT CONTRAST    Result Date: 2/12/2025   1. Presumed artifactual elevation of Tmax calculations. Given limitations of examination, MRI might be useful to further assess. Electronically Signed: Noel Bob MD  2/12/2025 8:09 PM EST  Workstation ID: [id number]    CT Head Without Contrast Stroke Protocol    Result Date: 2/12/2025  Impression: No acute intracranial findings. Electronically Signed: Elian KELVIN Mccauley  2/12/2025 7:40 PM EST  Workstation ID: [id number]   []  EKG/Telemetry   []  Cardiology/Vascular   []  Pathology  []  Old records  []  Other:    Assessment & Plan   Assessment / Plan     Assessment     TIA?    Large pleural effusion    Intractable headache    Essential hypertension    Hyperlipidemia    Type 2 diabetes mellitus with hyperglycemia    Acute Chf exacerbation     Plan  Echo reviewed, EF greater than 50%.  No clear valvular normalities, suggestive of HFpEF.  Check CT chest.  Will request pulmonologist thoracentesis/nodule biopsy based on finding  Twice daily Bumex IV for HFpEF exacerbation with effusion. Hold today for MARILY   Continue strict ins and outs, daily weights, monitor clinically for CHF improvement  CHF protocol  Neuro recommendations reviewed and appreciated.    MRI and CTA head and neck negative.    Clinical evidence of possible acute infarct/TIA.    Will recommend Holter/loop recorder outpatient on DC, continue Eliquis, statin  Maintain euglycemia. 10 units  detemir at night   Aspirin statin  Supportive care  Dysphagia screen    Disposition: Home Home health on discharge after weaning off oxygen, after workup complete    Discussed with RN.    VTE Prophylaxis:  Pharmacologic & mechanical VTE prophylaxis orders are present.        CODE STATUS:   Code Status (Patient has no pulse and is not breathing): CPR (Attempt to Resuscitate)  Medical Interventions (Patient has pulse or is breathing): Full Support      Electronically signed by Phill Shelton MD, 2/14/2025, 15:25 EST.

## 2025-02-14 NOTE — SIGNIFICANT NOTE
Wound Eval / Progress Noted    [facility]     Patient Name: Avila Parrish  : 1950  MRN: [id number]  Today's Date: 2025                 Admit Date: 2025    Visit Dx:    ICD-10-CM ICD-9-CM   1. Cerebrovascular accident (CVA), unspecified mechanism  I63.9 434.91   2. Acute intractable headache, unspecified headache type  R51.9 784.0   3. Pleural effusion, right  J90 511.9   4. Decreased activities of daily living (ADL)  Z78.9 V49.89   5. Difficulty in walking  R26.2 719.7         CVA (cerebral vascular accident)    Pleural effusion    Intractable headache    Essential hypertension    Hyperlipidemia    Type 2 diabetes mellitus with hyperglycemia        Past Medical History:   Diagnosis Date    Arthritis     CHF (congestive heart failure)     Coronary artery disease     Diabetes mellitus     Hyperlipidemia     Hypertension     Sleep apnea         Past Surgical History:   Procedure Laterality Date    COLONOSCOPY      COLONOSCOPY N/A 3/28/2022    Procedure: COLONOSCOPY WITH POLYPECTOMIES;  Surgeon: Tristan Cooper MD;  Location: [facility] ENDOSCOPY;  Service: Gastroenterology;  Laterality: N/A;  COLON POLYPS    TONSILLECTOMY           Physical Assessment:  Wound 25 0150 Right lower leg (Active)   Wound Image   25 1000   Dressing Appearance intact;moist drainage 25 1000   Closure None 25 1000   Base moist;red;pink 25 1000   Periwound edematous;swelling;warm;moist 25 1000   Periwound Temperature warm 25 1000   Periwound Skin Turgor soft 25 1000   Edges open;rolled/closed 25 1000   Drainage Characteristics/Odor serous 25 1000   Drainage Amount small 25 1000   Care, Wound cleansed with;sterile normal saline 25 1000   Dressing Care dressing applied;gauze;abdominal pad;petroleum-based;non-adherent 25 1000   Periwound Care absorptive dressing applied 25 1000       Wound 25 0150 Left lower leg (Active)   Wound  Image   02/14/25 1000   Dressing Appearance intact;moist drainage 02/14/25 1000   Closure None 02/14/25 1000   Base moist;pink;red 02/14/25 1000   Periwound swelling;redness;warm;edematous 02/14/25 1000   Periwound Temperature warm 02/14/25 1000   Periwound Skin Turgor soft 02/14/25 1000   Edges open 02/14/25 1000   Wound Length (cm) 1 cm 02/14/25 1000   Wound Width (cm) 2.2 cm 02/14/25 1000   Wound Depth (cm) 0.1 cm 02/14/25 1000   Wound Surface Area (cm^2) 2.2 cm^2 02/14/25 1000   Wound Volume (cm^3) 0.22 cm^3 02/14/25 1000   Drainage Characteristics/Odor serous 02/14/25 1000   Drainage Amount moderate 02/14/25 1000   Care, Wound cleansed with;sterile normal saline 02/14/25 1000   Dressing Care dressing applied;gauze;abdominal pad;petroleum-based;non-adherent 02/14/25 1000   Periwound Care absorptive dressing applied 02/14/25 1000       Wound 02/13/25 0150 Right anterior third toe (Active)   Wound Image   02/14/25 1000   Pressure Injury Stage 2 02/14/25 1000   Dressing Appearance open to air 02/14/25 1000   Closure None 02/14/25 1000   Base pink;moist 02/14/25 1000   Periwound intact;dry 02/14/25 1000   Periwound Temperature warm 02/14/25 1000   Periwound Skin Turgor soft 02/14/25 1000   Edges open 02/14/25 1000   Wound Length (cm) 0.3 cm 02/14/25 1000   Wound Width (cm) 0.7 cm 02/14/25 1000   Wound Depth (cm) 0.1 cm 02/14/25 1000   Wound Surface Area (cm^2) 0.21 cm^2 02/14/25 1000   Wound Volume (cm^3) 0.021 cm^3 02/14/25 1000   Drainage Amount none 02/14/25 1000   Care, Wound cleansed with;sterile normal saline 02/14/25 1000   Dressing Care dressing applied;non-adherent;petroleum-based 02/14/25 1000        Wound Check / Follow-up: Patient seen today for wound consult.  Patient is currently sitting up in chair resting quietly with eyes closed.  He does arouse to name but drifts off easily.  Explained purpose for visit and he is agreeable to assessment.    Patient was able to state that he does receive compression  wraps that are applied by a nurse that consist of an elastic wrap such as Ace.    Patient noted to have pitting edema to bilateral lower extremities with active serous weeping left worse than the right.  There is a superficial wound opening along the anterior right lower extremity with healthy red moist tissue.  To the left anterior lower extremity there is a slightly larger opening with pink to yellow moist tissue within the base.  Bilateral lower extremities with chronic discoloration as well as taut tissue.  Keratosis noted along the feet and posterior heels.  Recommending daily skin care with application of nonadherent petroleum-based gauze over the open tissue that is then covered with an ABD pad and secured with gauze roll and Ace wrap.  Gauze roll and Ace wrap should be applied from toes to below the knees.  Aquaphor may be applied to the keratotic skin or any dry flaking skin to bilateral lower extremities.    Patient does additionally have an injury to the lateral aspect of the right third toe consistent with pressure from the fourth toe.  Wound base here is pink and dry.  Cleansed with normal saline and gauze.  Recommending a daily application of nonadherent petroleum-based gauze and a Band-Aid.    Unable to assess gluteal aspects during the visit at this time.  Patient denies any other wounds or concerns.    Impression: Weeping edema to bilateral lower extremities.  Superficial tissue loss both to the right and left anterior lower legs.  Pressure injury to the right lateral third toe.    Short term goals: Regain skin integrity.  Daily dressing changes.  Skin protection and moisture prevention.  Pressure reduction.    Laurie Porras RN    2/14/2025    11:32 EST

## 2025-02-14 NOTE — PLAN OF CARE
Goal Outcome Evaluation:              Outcome Evaluation: No acute events this shift.  Place in iso to rule out cdiff infection.  VSS.

## 2025-02-15 ENCOUNTER — APPOINTMENT (OUTPATIENT)
Dept: GENERAL RADIOLOGY | Facility: HOSPITAL | Age: 75
DRG: 291 | End: 2025-02-15
Payer: OTHER GOVERNMENT

## 2025-02-15 LAB
ALBUMIN FLD-MCNC: 1.2 G/DL
ANION GAP SERPL CALCULATED.3IONS-SCNC: 12.3 MMOL/L (ref 5–15)
APPEARANCE FLD: ABNORMAL
BASOPHILS # BLD AUTO: 0.01 10*3/MM3 (ref 0–0.2)
BASOPHILS NFR BLD AUTO: 0.2 % (ref 0–1.5)
BUN SERPL-MCNC: 37 MG/DL (ref 8–23)
BUN/CREAT SERPL: 15.5 (ref 7–25)
CALCIUM SPEC-SCNC: 8.1 MG/DL (ref 8.6–10.5)
CHLORIDE SERPL-SCNC: 96 MMOL/L (ref 98–107)
CO2 SERPL-SCNC: 20.7 MMOL/L (ref 22–29)
COLOR FLD: YELLOW
CREAT SERPL-MCNC: 2.38 MG/DL (ref 0.76–1.27)
DEPRECATED RDW RBC AUTO: 49.1 FL (ref 37–54)
EGFRCR SERPLBLD CKD-EPI 2021: 27.6 ML/MIN/1.73
EOSINOPHIL # BLD AUTO: 0 10*3/MM3 (ref 0–0.4)
EOSINOPHIL NFR BLD AUTO: 0 % (ref 0.3–6.2)
ERYTHROCYTE [DISTWIDTH] IN BLOOD BY AUTOMATED COUNT: 15.6 % (ref 12.3–15.4)
GLUCOSE BLDC GLUCOMTR-MCNC: 140 MG/DL (ref 70–99)
GLUCOSE BLDC GLUCOMTR-MCNC: 207 MG/DL (ref 70–99)
GLUCOSE BLDC GLUCOMTR-MCNC: 209 MG/DL (ref 70–99)
GLUCOSE BLDC GLUCOMTR-MCNC: 258 MG/DL (ref 70–99)
GLUCOSE FLD-MCNC: 211 MG/DL
GLUCOSE SERPL-MCNC: 186 MG/DL (ref 65–99)
HCT VFR BLD AUTO: 37 % (ref 37.5–51)
HGB BLD-MCNC: 12.2 G/DL (ref 13–17.7)
IMM GRANULOCYTES # BLD AUTO: 0.02 10*3/MM3 (ref 0–0.05)
IMM GRANULOCYTES NFR BLD AUTO: 0.4 % (ref 0–0.5)
LDH FLD-CCNC: 79 U/L
LYMPHOCYTES # BLD AUTO: 0.61 10*3/MM3 (ref 0.7–3.1)
LYMPHOCYTES NFR BLD AUTO: 13 % (ref 19.6–45.3)
LYMPHOCYTES NFR FLD MANUAL: 15 %
MACROPHAGE FLUID %: 66 %
MCH RBC QN AUTO: 28.5 PG (ref 26.6–33)
MCHC RBC AUTO-ENTMCNC: 33 G/DL (ref 31.5–35.7)
MCV RBC AUTO: 86.4 FL (ref 79–97)
MESOTHL CELL NFR FLD MANUAL: 15 %
MONOCYTES # BLD AUTO: 0.81 10*3/MM3 (ref 0.1–0.9)
MONOCYTES NFR BLD AUTO: 17.2 % (ref 5–12)
MONOCYTES NFR FLD: 1 %
NEUTROPHILS NFR BLD AUTO: 3.25 10*3/MM3 (ref 1.7–7)
NEUTROPHILS NFR BLD AUTO: 69.2 % (ref 42.7–76)
NEUTROPHILS NFR FLD MANUAL: 3 %
NRBC BLD AUTO-RTO: 0 /100 WBC (ref 0–0.2)
NT-PROBNP SERPL-MCNC: 5401 PG/ML (ref 0–1800)
NUC CELL # FLD: 298 /MM3
PH FLD: 7.5 [PH]
PLATELET # BLD AUTO: 95 10*3/MM3 (ref 140–450)
PMV BLD AUTO: 11.9 FL (ref 6–12)
POTASSIUM SERPL-SCNC: 3.5 MMOL/L (ref 3.5–5.2)
PROT FLD-MCNC: 1.7 G/DL
RBC # BLD AUTO: 4.28 10*6/MM3 (ref 4.14–5.8)
RBC # FLD AUTO: 2000 /MM3
SODIUM SERPL-SCNC: 129 MMOL/L (ref 136–145)
WBC NRBC COR # BLD AUTO: 4.7 10*3/MM3 (ref 3.4–10.8)

## 2025-02-15 PROCEDURE — 94761 N-INVAS EAR/PLS OXIMETRY MLT: CPT

## 2025-02-15 PROCEDURE — 83986 ASSAY PH BODY FLUID NOS: CPT

## 2025-02-15 PROCEDURE — 63710000001 INSULIN GLARGINE PER 5 UNITS: Performed by: STUDENT IN AN ORGANIZED HEALTH CARE EDUCATION/TRAINING PROGRAM

## 2025-02-15 PROCEDURE — 82042 OTHER SOURCE ALBUMIN QUAN EA: CPT

## 2025-02-15 PROCEDURE — 83880 ASSAY OF NATRIURETIC PEPTIDE: CPT

## 2025-02-15 PROCEDURE — 87070 CULTURE OTHR SPECIMN AEROBIC: CPT

## 2025-02-15 PROCEDURE — 87206 SMEAR FLUORESCENT/ACID STAI: CPT

## 2025-02-15 PROCEDURE — 63710000001 INSULIN LISPRO (HUMAN) PER 5 UNITS: Performed by: PHYSICIAN ASSISTANT

## 2025-02-15 PROCEDURE — 80048 BASIC METABOLIC PNL TOTAL CA: CPT | Performed by: STUDENT IN AN ORGANIZED HEALTH CARE EDUCATION/TRAINING PROGRAM

## 2025-02-15 PROCEDURE — 97530 THERAPEUTIC ACTIVITIES: CPT

## 2025-02-15 PROCEDURE — 84157 ASSAY OF PROTEIN OTHER: CPT

## 2025-02-15 PROCEDURE — 82948 REAGENT STRIP/BLOOD GLUCOSE: CPT | Performed by: PHYSICIAN ASSISTANT

## 2025-02-15 PROCEDURE — 71045 X-RAY EXAM CHEST 1 VIEW: CPT

## 2025-02-15 PROCEDURE — 87116 MYCOBACTERIA CULTURE: CPT

## 2025-02-15 PROCEDURE — 83615 LACTATE (LD) (LDH) ENZYME: CPT

## 2025-02-15 PROCEDURE — 99232 SBSQ HOSP IP/OBS MODERATE 35: CPT | Performed by: STUDENT IN AN ORGANIZED HEALTH CARE EDUCATION/TRAINING PROGRAM

## 2025-02-15 PROCEDURE — 99223 1ST HOSP IP/OBS HIGH 75: CPT | Performed by: INTERNAL MEDICINE

## 2025-02-15 PROCEDURE — 82948 REAGENT STRIP/BLOOD GLUCOSE: CPT

## 2025-02-15 PROCEDURE — 87205 SMEAR GRAM STAIN: CPT

## 2025-02-15 PROCEDURE — 94799 UNLISTED PULMONARY SVC/PX: CPT

## 2025-02-15 PROCEDURE — 88305 TISSUE EXAM BY PATHOLOGIST: CPT

## 2025-02-15 PROCEDURE — 89051 BODY FLUID CELL COUNT: CPT

## 2025-02-15 PROCEDURE — 85025 COMPLETE CBC W/AUTO DIFF WBC: CPT | Performed by: STUDENT IN AN ORGANIZED HEALTH CARE EDUCATION/TRAINING PROGRAM

## 2025-02-15 PROCEDURE — 97110 THERAPEUTIC EXERCISES: CPT

## 2025-02-15 PROCEDURE — 87075 CULTR BACTERIA EXCEPT BLOOD: CPT

## 2025-02-15 PROCEDURE — 88108 CYTOPATH CONCENTRATE TECH: CPT

## 2025-02-15 PROCEDURE — 82945 GLUCOSE OTHER FLUID: CPT

## 2025-02-15 PROCEDURE — 0W993ZZ DRAINAGE OF RIGHT PLEURAL CAVITY, PERCUTANEOUS APPROACH: ICD-10-PCS | Performed by: INTERNAL MEDICINE

## 2025-02-15 PROCEDURE — 32555 ASPIRATE PLEURA W/ IMAGING: CPT | Performed by: INTERNAL MEDICINE

## 2025-02-15 RX ORDER — OXYCODONE HYDROCHLORIDE 5 MG/1
5 TABLET ORAL EVERY 8 HOURS PRN
Status: DISPENSED | OUTPATIENT
Start: 2025-02-15 | End: 2025-02-17

## 2025-02-15 RX ORDER — ACETAMINOPHEN 500 MG
1000 TABLET ORAL EVERY 8 HOURS
Status: DISCONTINUED | OUTPATIENT
Start: 2025-02-15 | End: 2025-02-18 | Stop reason: HOSPADM

## 2025-02-15 RX ORDER — TRAMADOL HYDROCHLORIDE 50 MG/1
50 TABLET ORAL ONCE
Status: COMPLETED | OUTPATIENT
Start: 2025-02-15 | End: 2025-02-15

## 2025-02-15 RX ADMIN — ACETAMINOPHEN 1000 MG: 500 TABLET ORAL at 20:51

## 2025-02-15 RX ADMIN — FINASTERIDE 5 MG: 5 TABLET, FILM COATED ORAL at 08:25

## 2025-02-15 RX ADMIN — ACETAMINOPHEN 1000 MG: 500 TABLET ORAL at 03:39

## 2025-02-15 RX ADMIN — METOPROLOL TARTRATE 100 MG: 50 TABLET, FILM COATED ORAL at 20:51

## 2025-02-15 RX ADMIN — ASPIRIN 81 MG: 81 TABLET, CHEWABLE ORAL at 08:25

## 2025-02-15 RX ADMIN — INSULIN LISPRO 4 UNITS: 100 INJECTION, SOLUTION INTRAVENOUS; SUBCUTANEOUS at 17:22

## 2025-02-15 RX ADMIN — INSULIN LISPRO 4 UNITS: 100 INJECTION, SOLUTION INTRAVENOUS; SUBCUTANEOUS at 08:25

## 2025-02-15 RX ADMIN — TAMSULOSIN HYDROCHLORIDE 0.8 MG: 0.4 CAPSULE ORAL at 20:51

## 2025-02-15 RX ADMIN — ACETAMINOPHEN 1000 MG: 500 TABLET ORAL at 12:29

## 2025-02-15 RX ADMIN — ATORVASTATIN CALCIUM 80 MG: 40 TABLET, FILM COATED ORAL at 20:51

## 2025-02-15 RX ADMIN — OXYCODONE HYDROCHLORIDE 5 MG: 5 TABLET ORAL at 03:39

## 2025-02-15 RX ADMIN — TRAMADOL HYDROCHLORIDE 50 MG: 50 TABLET, COATED ORAL at 00:17

## 2025-02-15 RX ADMIN — INSULIN GLARGINE 10 UNITS: 100 INJECTION, SOLUTION SUBCUTANEOUS at 20:50

## 2025-02-15 RX ADMIN — Medication 10 ML: at 20:51

## 2025-02-15 RX ADMIN — INSULIN LISPRO 6 UNITS: 100 INJECTION, SOLUTION INTRAVENOUS; SUBCUTANEOUS at 12:29

## 2025-02-15 RX ADMIN — WHITE PETROLATUM 1 APPLICATION: 1.75 OINTMENT TOPICAL at 09:52

## 2025-02-15 RX ADMIN — METOPROLOL TARTRATE 100 MG: 50 TABLET, FILM COATED ORAL at 08:25

## 2025-02-15 RX ADMIN — Medication 10 ML: at 08:25

## 2025-02-15 NOTE — PROCEDURES
"Thoracentesis at Bedside    Date/Time: 2/15/2025 5:26 PM    Performed by: Robin Billy DO  Authorized by: Beck Jones APRN  Consent: Verbal consent obtained. Written consent obtained.  Risks and benefits: risks, benefits and alternatives were discussed  Consent given by: patient  Patient consent: the patient's understanding of the procedure matches consent given  Procedure consent: procedure consent matches procedure scheduled  Relevant documents: relevant documents present and verified  Test results: test results available and properly labeled  Site marked: the operative site was marked  Imaging studies: imaging studies available  Patient identity confirmed: verbally with patient  Time out: Immediately prior to procedure a \"time out\" was called to verify the correct patient, procedure, equipment, support staff and site/side marked as required.  Procedure purpose: diagnostic and therapeutic  Indications: pleural effusion  Preparation: Patient was prepped and draped in the usual sterile fashion.  Local anesthesia used: yes  Anesthesia: local infiltration    Anesthesia:  Local anesthesia used: yes  Local Anesthetic: lidocaine 1% without epinephrine  Anesthetic total: 5 mL    Sedation:  Patient sedated: no    Preparation: skin prepped with ChloraPrep and sterile field established  Patient position: supported by bedside stand  Ultrasound guidance: yes  Puncture method: over-the-needle catheter  Number of attempts: 1  Drainage amount: 1400 ml  Drainage characteristics: serous  Patient tolerance: patient tolerated the procedure well with no immediate complications  Chest x-ray performed: yes  Chest x-ray interpreted by me.        "

## 2025-02-15 NOTE — THERAPY TREATMENT NOTE
Acute Care - Physical Therapy Treatment Note   [place]     Patient Name: Avila Parrish  : 1950  MRN: [id number]  Today's Date: 2/15/2025      Visit Dx:     ICD-10-CM ICD-9-CM   1. Cerebrovascular accident (CVA), unspecified mechanism  I63.9 434.91   2. Acute intractable headache, unspecified headache type  R51.9 784.0   3. Pleural effusion, right  J90 511.9   4. Decreased activities of daily living (ADL)  Z78.9 V49.89   5. Difficulty in walking  R26.2 719.7     Patient Active Problem List   Diagnosis    Colon cancer screening    CVA (cerebral vascular accident)    Pleural effusion    Intractable headache    Essential hypertension    Hyperlipidemia    Type 2 diabetes mellitus with hyperglycemia     Past Medical History:   Diagnosis Date    Arthritis     CHF (congestive heart failure)     Coronary artery disease     Diabetes mellitus     Hyperlipidemia     Hypertension     Sleep apnea      Past Surgical History:   Procedure Laterality Date    COLONOSCOPY      COLONOSCOPY N/A 3/28/2022    Procedure: COLONOSCOPY WITH POLYPECTOMIES;  Surgeon: Tristan Cooper MD;  Location: [facility] ENDOSCOPY;  Service: Gastroenterology;  Laterality: N/A;  COLON POLYPS    TONSILLECTOMY       PT Assessment (Last 12 Hours)       PT Evaluation and Treatment       Row Name 02/15/25 1411          Physical Therapy Time and Intention    Subjective Information complains of;fatigue  -SM     Document Type therapy note (daily note)  -SM     Mode of Treatment individual therapy;physical therapy  -SM     Patient Effort good  -SM     Symptoms Noted During/After Treatment fatigue;shortness of breath  -SM       Row Name 02/15/25 1411          Bed Mobility    Bed Mobility sit-supine  -     Sit-Supine Allen (Bed Mobility) minimum assist (75% patient effort)  -     Bed Mobility, Safety Issues decreased use of legs for bridging/pushing  -     Assistive Device (Bed Mobility) bed rails;head of bed elevated  -       Row  Name 02/15/25 1411          Transfers    Transfers chair-bed transfer  -       Row Name 02/15/25 1411          Chair-Bed Transfer    Chair-Bed Foster (Transfers) contact guard  -       Row Name 02/15/25 1411          Sit-Stand Transfer    Sit-Stand Foster (Transfers) contact guard  -       Row Name 02/15/25 1411          Stand-Sit Transfer    Stand-Sit Foster (Transfers) contact guard  -       Row Name 02/15/25 1411          Safety Issues/Impairments Affecting Functional Mobility    Impairments Affecting Function (Mobility) balance;endurance/activity tolerance;strength;shortness of breath  -       Row Name 02/15/25 1411          Balance    Dynamic Standing Balance contact guard  -       Row Name 02/15/25 1411          Motor Skills    Therapeutic Exercise hip;knee;ankle  20x ankle pumps;10x LAQ's, seated marches  -       Row Name             Wound 02/13/25 0150 Right lower leg    Wound - Properties Group Placement Date: 02/13/25  -RF Placement Time: 0150  -RF Side: Right  -RF Orientation: lower  -RF Location: leg  -RF Present on Original Admission: Y  -RF    Retired Wound - Properties Group Placement Date: 02/13/25  -RF Placement Time: 0150  -RF Present on Original Admission: Y  -RF Side: Right  -RF Orientation: lower  -RF Location: leg  -RF    Retired Wound - Properties Group Placement Date: 02/13/25  -RF Placement Time: 0150  -RF Present on Original Admission: Y  -RF Side: Right  -RF Orientation: lower  -RF Location: leg  -RF    Retired Wound - Properties Group Date first assessed: 02/13/25  -RF Time first assessed: 0150  -RF Present on Original Admission: Y  -RF Side: Right  -RF Location: leg  -RF      Row Name             Wound 02/13/25 0150 Left lower leg    Wound - Properties Group Placement Date: 02/13/25  -RF Placement Time: 0150  -RF Side: Left  -RF Orientation: lower  -RF Location: leg  -RF Present on Original Admission: Y  -RF    Retired Wound - Properties Group Placement  Date: 02/13/25  -RF Placement Time: 0150  -RF Present on Original Admission: Y  -RF Side: Left  -RF Orientation: lower  -RF Location: leg  -RF    Retired Wound - Properties Group Placement Date: 02/13/25  -RF Placement Time: 0150  -RF Present on Original Admission: Y  -RF Side: Left  -RF Orientation: lower  -RF Location: leg  -RF    Retired Wound - Properties Group Date first assessed: 02/13/25  -RF Time first assessed: 0150  -RF Present on Original Admission: Y  -RF Side: Left  -RF Location: leg  -RF      Row Name             Wound 02/13/25 0150 Right anterior third toe    Wound - Properties Group Placement Date: 02/13/25  -RF Placement Time: 0150  -RF Side: Right  -RF Orientation: anterior  -RF Location: third toe  -RF Present on Original Admission: Y  -RF    Retired Wound - Properties Group Placement Date: 02/13/25  -RF Placement Time: 0150  -RF Present on Original Admission: Y  -RF Side: Right  -RF Orientation: anterior  -RF Location: third toe  -RF    Retired Wound - Properties Group Placement Date: 02/13/25  -RF Placement Time: 0150  -RF Present on Original Admission: Y  -RF Side: Right  -RF Orientation: anterior  -RF Location: third toe  -RF    Retired Wound - Properties Group Date first assessed: 02/13/25  -RF Time first assessed: 0150  -RF Present on Original Admission: Y  -RF Side: Right  -RF Location: third toe  -RF      Row Name             Wound 02/13/25 0150 Bilateral gluteal    Wound - Properties Group Placement Date: 02/13/25  -RF Placement Time: 0150  -RF Side: Bilateral  -RF Location: gluteal  -RF Present on Original Admission: Y  -RF    Retired Wound - Properties Group Placement Date: 02/13/25  -RF Placement Time: 0150  -RF Present on Original Admission: Y  -RF Side: Bilateral  -RF Location: gluteal  -RF    Retired Wound - Properties Group Placement Date: 02/13/25  -RF Placement Time: 0150  -RF Present on Original Admission: Y  -RF Side: Bilateral  -RF Location: gluteal  -RF    Retired Wound -  Properties Group Date first assessed: 02/13/25  -RF Time first assessed: 0150  -RF Present on Original Admission: Y  -RF Side: Bilateral  -RF Location: gluteal  -RF      Row Name 02/15/25 1411          Positioning and Restraints    Pre-Treatment Position sitting in chair/recliner  -SM     Post Treatment Position bed  -SM     In Bed fowlers;call light within reach;encouraged to call for assist;exit alarm on;with nsg  -SM       Row Name 02/15/25 1411          Progress Summary (PT)    Progress Toward Functional Goals (PT) progress toward functional goals is good  -               User Key  (r) = Recorded By, (t) = Taken By, (c) = Cosigned By      Initials Name Provider Type     Aylin Coronel PTA Physical Therapist Assistant    Sarai Chávez RN Registered Nurse                    Physical Therapy Education       Title: PT OT SLP Therapies (In Progress)       Topic: Physical Therapy (Not Started)       Point: Mobility training (Not Started)       Learner Progress:  Not documented in this visit.              Point: Home exercise program (Not Started)       Learner Progress:  Not documented in this visit.              Point: Body mechanics (Not Started)       Learner Progress:  Not documented in this visit.              Point: Precautions (Not Started)       Learner Progress:  Not documented in this visit.                                  PT Recommendation and Plan     Progress Summary (PT)  Progress Toward Functional Goals (PT): progress toward functional goals is good   Outcome Measures       Row Name 02/15/25 1413 02/14/25 1250 02/13/25 1000       How much help from another person do you currently need...    Turning from your back to your side while in flat bed without using bedrails? 4  -SM 4  -SM 3  -LEANNE (r) TM (t) LEANNE (c)    Moving from lying on back to sitting on the side of a flat bed without bedrails? 3  -SM 3  -SM 3  -LEANNE (r) TM (t) LEANNE (c)    Moving to and from a bed to a chair (including a wheelchair)? 3   -SM 3  -SM 3  -LEANNE (r) TM (t) LEANNE (c)    Standing up from a chair using your arms (e.g., wheelchair, bedside chair)? 4  -SM 4  -SM 3  -LEANNE (r) TM (t) LEANNE (c)    Climbing 3-5 steps with a railing? 3  -SM 3  -SM 2  -LEANNE (r) TM (t) LEANNE (c)    To walk in hospital room? 3  -SM 3  -SM 3  -LEANNE (r) TM (t) LEANNE (c)    AM-PAC 6 Clicks Score (PT) 20  -SM 20  -SM 17  -LEANNE (r) TM (t)       Functional Assessment    Outcome Measure Options -- -- AM-PAC 6 Clicks Basic Mobility (PT)  -LEANNE (r) TM (t) LEANNE (c)              User Key  (r) = Recorded By, (t) = Taken By, (c) = Cosigned By      Initials Name Provider Type    Carlos Alberto Whittaker, PT Physical Therapist     Aylin Coronel PTA Physical Therapist Assistant    Reed Aguilera, PT Student PT Student                     Time Calculation:    PT Charges       Row Name 02/15/25 1410             Time Calculation    PT Received On 02/15/25  -SM         Timed Charges    29357 - PT Therapeutic Exercise Minutes 8  -SM      83282 - PT Therapeutic Activity Minutes 15  -SM         Total Minutes    Timed Charges Total Minutes 23  -SM       Total Minutes 23  -SM                User Key  (r) = Recorded By, (t) = Taken By, (c) = Cosigned By      Initials Name Provider Type     Aylin Coronel PTA Physical Therapist Assistant                  Therapy Charges for Today       Code Description Service Date Service Provider Modifiers Qty    04553311927 HC GAIT TRAINING EA 15 MIN 2/14/2025 Aylin Coronel PTA GP 1    84705649633 HC PT THERAPEUTIC ACT EA 15 MIN 2/14/2025 Aylin Coronel PTA GP 2            PT G-Codes  Outcome Measure Options: AM-PAC 6 Clicks Basic Mobility (PT)  AM-PAC 6 Clicks Score (PT): 20  AM-PAC 6 Clicks Score (OT): 18    Aylin Coronel PTA  2/15/2025     Zygomaticofacial Flap Text: Given the location of the defect, shape of the defect and the proximity to free margins a zygomaticofacial flap was deemed most appropriate for repair.  Using a sterile surgical marker, the appropriate flap was drawn incorporating the defect and placing the expected incisions within the relaxed skin tension lines where possible. The area thus outlined was incised deep to adipose tissue with a #15 scalpel blade with preservation of a vascular pedicle.  The skin margins were undermined to an appropriate distance in all directions utilizing iris scissors.  The flap was then placed into the defect and anchored with interrupted buried subcutaneous sutures.

## 2025-02-15 NOTE — PLAN OF CARE
Goal Outcome Evaluation:              Outcome Evaluation: No acute changes. VSS.

## 2025-02-15 NOTE — CONSULTS
[facility]   Consult Note    Patient Name: Avila Parrish  : 1950  MRN: [id number]  Primary Care Physician:  Joaquim Portillo MD  Referring Physician: No ref. provider found  Date of admission: 2025    Consults  Subjective   Subjective     Reason for Consult/ Chief Complaint: Worsening shortness of breath    History of Present Illness  Avila Parrish is a 74 y.o. male with past medical history of CHF, CKD, A-fib, BARBARA resented the ED for evaluation of worsening shortness of breath.  During hospitalization chest CT was obtained demonstrating a 2.3 x 2.2 cm spiculated right upper lobe nodule.  Chest CT also demonstrating moderate to large right pleural effusion.  The service was consulted to assist in management treating the patient's acute issues.  Upon assessment, patient seen on edge of bed in no apparent respiratory distress.  Runnings and plan were discussed the patient.  Patient stating that [facility] and [facility].  Patient reporting that exam difficulty lying flat.  Discussed with patient about moving forward with thoracentesis.  Patient agreeable at this time.    Review of Systems     Personal History     Past Medical History:   Diagnosis Date    Arthritis     CHF (congestive heart failure)     Coronary artery disease     Diabetes mellitus     Hyperlipidemia     Hypertension     Sleep apnea        Past Surgical History:   Procedure Laterality Date    COLONOSCOPY  2017    COLONOSCOPY N/A 3/28/2022    Procedure: COLONOSCOPY WITH POLYPECTOMIES;  Surgeon: Tristan Cooper MD;  Location: [facility] ENDOSCOPY;  Service: Gastroenterology;  Laterality: N/A;  COLON POLYPS    TONSILLECTOMY         Family History: family history is not on file. Otherwise pertinent FHx was reviewed and not pertinent to current issue.    Social History:  reports that he quit smoking about 4 months ago. His smoking use included cigars and cigarettes. He has a 1.3  pack-year smoking history. He has never used smokeless tobacco. He reports that he does not currently use alcohol. Drug use questions deferred to the physician.    Home Medications:   Tirzepatide, apixaban, atorvastatin, baclofen, bumetanide, cholecalciferol, finasteride, furosemide, hydrALAZINE, insulin aspart prot-insulin aspart, latanoprost, lisinopril, metFORMIN ER, metoprolol tartrate, mupirocin, potassium chloride, tamsulosin, and trospium    Allergies:  No Known Allergies    Objective    Objective     Vitals:  Temp:  [97.9 °F (36.6 °C)-98.6 °F (37 °C)] 97.9 °F (36.6 °C)  Heart Rate:  [70-86] 70  Resp:  [18-20] 20  BP: (100-126)/(57-79) 118/76  Flow (L/min) (Oxygen Therapy):  [1.5] 1.5    Physical Exam  Obese male  Diminished breath sounds on the right  Right chest total percussion  Result Review    Result Review:  I have personally reviewed the results from the time of this admission to 2/15/2025 16:41 EST and agree with these findings:  [x]  Laboratory  [x]  Microbiology  [x]  Radiology  []  EKG/Telemetry   []  Cardiology/Vascular   []  Pathology  []  Old records  []  Other:    Most notable findings include:         Lab 02/15/25  0523 02/14/25  0421 02/12/25 1952   WBC 4.70 4.72 6.84   HEMOGLOBIN 12.2* 12.7* 14.8   HEMATOCRIT 37.0* 39.1 45.3   PLATELETS 95* 102* 130*   SODIUM 129* 134* 138   POTASSIUM 3.5 3.5 4.0   CHLORIDE 96* 101 102   CO2 20.7* 22.3 22.9   BUN 37* 30* 15   CREATININE 2.38* 2.46* 1.95*   GLUCOSE 186* 153* 150*   CALCIUM 8.1* 8.5* 9.5   TOTAL PROTEIN  --   --  6.6   ALBUMIN  --   --  3.5   GLOBULIN  --   --  3.1       Assessment & Plan   Assessment / Plan     Brief Patient Summary:  Avila Parrish is a 74 y.o. male to the hospital with shortness of breath    Active Hospital Problems:  Active Hospital Problems    Diagnosis     **CVA (cerebral vascular accident)     Pleural effusion     Intractable headache     Essential hypertension     Hyperlipidemia     Type 2 diabetes mellitus with  hyperglycemia    Large right-sided pleural effusion  Right upper lobe spiculated nodule concerning for malignancy    Plan:  Patient states that [facility]  Was not given a calls of this pleural effusion  We will perform thoracentesis today and send for cytology, cell count and chemistry  Patient states that [facility] and he was considering letting them biopsy this in the future  Will defer to the patient at this time if he wants us to biopsy while here or if he wants to [facility] the [facility] as [facility]  Okay from a standpoint to resume Eliquis post thoracentesis  Suspect the effusion is transudative effusion  Will discuss in further detail with the patient tomorrow if he wants this biopsied here or if he was this lesion biopsied at the [facility]    Electronically signed by TRAVIS Franklin, 02/15/25, 5:15 PM EST.    I personally seen  examined this patient and the medical decision making and assessment and plan reflect my and I assume responsibility for the care of this patient    Electronically signed by Robin Billy DO, 02/15/25, 4:41 PM EST.

## 2025-02-15 NOTE — PROGRESS NOTES
[facility]   Hospitalist Progress Note  Date: 2/15/2025  Patient Name: Avila Parrish  : 1950  MRN: [id number]  Date of admission: 2025  Room/Bed: 213/1      Subjective   Subjective     Chief Complaint:   Chief Complaint   Patient presents with    Headache    Altered Mental Status       Summary:     74 y.o. male who presented with chief complaint of shortness of breath and headache.  He was also described by his daughter to have possible slurring of speech, and altered mental status, and leg weakness.  Last known normal was the night prior.  Patient's daughter spoke to him around 1:30 in the afternoon and reported that he seemed confused and had slurred speech.  He did not present to the emergency department until the evening.  He is on Eliquis for A-fib.  He was evaluated by teleneurology and also had stroke CT package that did not show acute intracranial pathology.     He was admitted for further evaluation management under the care of the hospitalist service.    Interval Followup:     Patient underwent CTA head and neck, MRI brain without contrast and CT perfusion with and without contrast. MRI was negative for acute abnormalities.  Was noted to have incidental large right pleural effusion and neck CTA.  Patient oxygen weaned from 6 L nasal cannula with aggressive diuresis.  He developed acute kidney injury.  On inquiry, patient states he had a pleural effusion 6 weeks ago [facility]  He initially denied having a history of pulmonary nodule, but then stated he was found to have something on his lung and [facility].  Patient states he has been slow to schedule this.    02/15/25 patient oxygenation continues to improve.  His creatinine is downtrending.  Dedicated CT chest performed which is notable for a 2.3 x 2.2 cm spiculated right upper lobe nodule and moderate to large right pleural effusion with associated  right lower lobe consolidation and atelectasis.  Pulmonologist consulted.      Objective   Objective     Vitals:   Temp:  [97.9 °F (36.6 °C)-99 °F (37.2 °C)] 97.9 °F (36.6 °C)  Heart Rate:  [80-87] 80  Resp:  [18-20] 20  BP: (100-126)/(57-79) 100/57  Flow (L/min) (Oxygen Therapy):  [1.5] 1.5    Physical Exam   General: Awake, alert, in no acute distress.   HENT: Atraumatic, normocephalic. Nasal cannula in place 1.5 L minute oxygen flow rate  Eyes: pupils equal, round, without scleral icterus  Cardiovascular: Regular rate and rhythm, no murmurs   Pulmonary: CTA bilaterally, no conversational dyspnea  Gastrointestinal: Soft nontender nondistended  Musculoskeletal: Lower extremity bilateral ankle edema noted.  Didier improving      Result Review    Result Review:  I have personally reviewed these results:  [x]  Laboratory      Lab 02/15/25  0523 02/14/25  0421 02/12/25  1952   WBC 4.70 4.72 6.84   HEMOGLOBIN 12.2* 12.7* 14.8   HEMATOCRIT 37.0* 39.1 45.3   PLATELETS 95* 102* 130*   NEUTROS ABS 3.25 3.44 5.42   IMMATURE GRANS (ABS) 0.02 0.05 0.06*   LYMPHS ABS 0.61* 0.65* 0.71   MONOS ABS 0.81 0.56 0.63   EOS ABS 0.00 0.01 0.00   MCV 86.4 85.9 86.8   PROTIME  --   --  16.4*   APTT  --   --  34.1         Lab 02/15/25  0523 02/14/25  0421 02/13/25  0513 02/12/25  1952   SODIUM 129* 134*  --  138   POTASSIUM 3.5 3.5  --  4.0   CHLORIDE 96* 101  --  102   CO2 20.7* 22.3  --  22.9   ANION GAP 12.3 10.7  --  13.1   BUN 37* 30*  --  15   CREATININE 2.38* 2.46*  --  1.95*   EGFR 27.6* 26.5*  --  35.0*   GLUCOSE 186* 153*  --  150*   CALCIUM 8.1* 8.5*  --  9.5   HEMOGLOBIN A1C  --   --  6.90*  --          Lab 02/12/25 1952   TOTAL PROTEIN 6.6   ALBUMIN 3.5   GLOBULIN 3.1   ALT (SGPT) 6   AST (SGOT) 16   BILIRUBIN 0.7   ALK PHOS 66         Lab 02/12/25 1952   PROTIME 16.4*   INR 1.27*         Lab 02/13/25  0513   CHOLESTEROL 172   LDL CHOL 106*   HDL CHOL 35*   TRIGLYCERIDES 179*         Lab 02/12/25 2032 02/12/25 1952   ABO  TYPING O O   RH TYPING Negative Negative   ANTIBODY SCREEN  --  Negative         Brief Urine Lab Results  (Last result in the past 365 days)        Color   Clarity   Blood   Leuk Est   Nitrite   Protein   CREAT   Urine HCG        02/13/25 0309 Yellow   Clear   Moderate (2+)   Negative   Negative   >=300 mg/dL (3+)                 [x]  Microbiology   Microbiology Results (last 10 days)       ** No results found for the last 240 hours. **          [x]  Radiology  CT Chest Without Contrast Diagnostic    Result Date: 2/14/2025  1.There is a 2.3 x 2.2 cm spiculated right upper lobe nodule. Malignancy not excluded. Follow-up with a PET/CT or potentially a follow-up chest CT in 3 months recommended. 2.Moderate to large right pleural effusion. 3.Right lower lobe consolidation may reflect atelectasis and/or pneumonia. There is some probable atelectasis in the right middle lobe as well. 4.Atherosclerotic disease and coronary artery disease. Electronically Signed: Pastor Ambrocio MD  2/14/2025 9:16 PM EST  Workstation ID: [id number]    MRI Brain Without Contrast    Result Date: 2/13/2025  Impression: 1.No acute intracranial abnormality. 2.Chronic microvascular ischemic changes. 3.Diffuse brain atrophy. Electronically Signed: Randall Hoang MD  2/13/2025 8:39 AM EST  Workstation ID: [id number]    CT Angiogram Head w AI Analysis of LVO    Result Date: 2/12/2025  Impression: No proximal large vessel occlusion or major stenosis of the anterior circulation. Diminutive appearing vertebrobasilar circulation with fetal-type bilateral PCAs, which appear grossly patent. The V4 segment of the right vertebral artery is diminutive and is not clearly visualized distally and in the basilar artery appears diminutive and does not appear convincingly opacified in its midportion, unclear whether this is secondary to diminutive vertebrobasilar circulation and/or multifocal severe stenosis/focal occlusions. Recommend correlation with patient's  symptoms and consider MRI for further evaluation if clinically warranted. Large right pleural effusion. Right upper lobe pulmonary nodule measuring 2.1 cm. Although this may be infectious/inflammatory, malignancy is not excluded and dedicated chest CT can be considered for further evaluation. Electronically Signed: Elian Keysneo  2/12/2025 9:14 PM EST  Workstation ID: [id number]    CT Angiogram Neck    Result Date: 2/12/2025  Impression: No proximal large vessel occlusion or major stenosis of the anterior circulation. Diminutive appearing vertebrobasilar circulation with fetal-type bilateral PCAs, which appear grossly patent. The V4 segment of the right vertebral artery is diminutive and is not clearly visualized distally and in the basilar artery appears diminutive and does not appear convincingly opacified in its midportion, unclear whether this is secondary to diminutive vertebrobasilar circulation and/or multifocal severe stenosis/focal occlusions. Recommend correlation with patient's symptoms and consider MRI for further evaluation if clinically warranted. Large right pleural effusion. Right upper lobe pulmonary nodule measuring 2.1 cm. Although this may be infectious/inflammatory, malignancy is not excluded and dedicated chest CT can be considered for further evaluation. Electronically Signed: Eliandanis Mccauley  2/12/2025 9:14 PM EST  Workstation ID: [id number]    XR Chest 1 View    Result Date: 2/12/2025  Impression: Interstitial changes likely representing interstitial edema with small right pleural effusion Electronically Signed: Jesus Santiago  2/12/2025 8:35 PM EST  Workstation ID: [id number]    CT CEREBRAL PERFUSION WITH & WITHOUT CONTRAST    Result Date: 2/12/2025   1. Presumed artifactual elevation of Tmax calculations. Given limitations of examination, MRI might be useful to further assess. Electronically Signed: Noel Bob MD  2/12/2025 8:09 PM EST  Workstation ID: [id number]    CT Head Without Contrast  Stroke Protocol    Result Date: 2/12/2025  Impression: No acute intracranial findings. Electronically Signed: Elian Mccauley  2/12/2025 7:40 PM EST  Workstation ID: [id number]   []  EKG/Telemetry   []  Cardiology/Vascular   []  Pathology  []  Old records  []  Other:    Assessment & Plan   Assessment / Plan     Assessment     TIA versus stroke    Large pleural effusion    Spiculated lung nodule    Intractable headache    Essential hypertension    Hyperlipidemia    Type 2 diabetes mellitus with hyperglycemia    Acute Chf exacerbation     Plan  TTE reviewed: Normal EF, no valvulopathy  CT chest reviewed.  Pulmonologist consulted, may benefit from thoracentesis/nodule biopsy   Holding twice daily Bumex IV for HFpEF exacerbation given MARILY   Continue strict ins and outs, daily weights, monitor clinically for CHF improvement  Continue CHF protocol  For possible CVA, neuro recommendations reviewed and appreciated.    MRI and CTA head and neck negative.    Clinical evidence of possible acute infarct/TIA.    Will recommend Holter/loop recorder outpatient on DC.  Continue statin, aspirin  Hold Eliquis in setting of possible upcoming procedures  Maintain euglycemia. 10 units detemir at night       Disposition: Home Home health on discharge after weaning off oxygen, after workup complete    Discussed with RN.    VTE Prophylaxis:  Pharmacologic & mechanical VTE prophylaxis orders are present.        CODE STATUS:   Code Status (Patient has no pulse and is not breathing): CPR (Attempt to Resuscitate)  Medical Interventions (Patient has pulse or is breathing): Full Support      Electronically signed by Phill Shelton MD, 2/15/2025, 10:27 EST.

## 2025-02-16 LAB
027 TOXIN: NORMAL
ANION GAP SERPL CALCULATED.3IONS-SCNC: 14.3 MMOL/L (ref 5–15)
BUN SERPL-MCNC: 45 MG/DL (ref 8–23)
BUN/CREAT SERPL: 18.5 (ref 7–25)
C DIFF TOX GENS STL QL NAA+PROBE: NEGATIVE
CALCIUM SPEC-SCNC: 7.8 MG/DL (ref 8.6–10.5)
CHLORIDE SERPL-SCNC: 99 MMOL/L (ref 98–107)
CO2 SERPL-SCNC: 20.7 MMOL/L (ref 22–29)
CREAT SERPL-MCNC: 2.43 MG/DL (ref 0.76–1.27)
EGFRCR SERPLBLD CKD-EPI 2021: 26.9 ML/MIN/1.73
GLUCOSE BLDC GLUCOMTR-MCNC: 162 MG/DL (ref 70–99)
GLUCOSE BLDC GLUCOMTR-MCNC: 206 MG/DL (ref 70–99)
GLUCOSE BLDC GLUCOMTR-MCNC: 248 MG/DL (ref 70–99)
GLUCOSE SERPL-MCNC: 166 MG/DL (ref 65–99)
POTASSIUM SERPL-SCNC: 3.4 MMOL/L (ref 3.5–5.2)
SODIUM SERPL-SCNC: 134 MMOL/L (ref 136–145)
WHOLE BLOOD HOLD SPECIMEN: NORMAL

## 2025-02-16 PROCEDURE — 82948 REAGENT STRIP/BLOOD GLUCOSE: CPT | Performed by: PHYSICIAN ASSISTANT

## 2025-02-16 PROCEDURE — 97530 THERAPEUTIC ACTIVITIES: CPT

## 2025-02-16 PROCEDURE — 97116 GAIT TRAINING THERAPY: CPT

## 2025-02-16 PROCEDURE — 25810000003 SODIUM CHLORIDE 0.9 % SOLUTION: Performed by: STUDENT IN AN ORGANIZED HEALTH CARE EDUCATION/TRAINING PROGRAM

## 2025-02-16 PROCEDURE — 99232 SBSQ HOSP IP/OBS MODERATE 35: CPT | Performed by: STUDENT IN AN ORGANIZED HEALTH CARE EDUCATION/TRAINING PROGRAM

## 2025-02-16 PROCEDURE — 82948 REAGENT STRIP/BLOOD GLUCOSE: CPT

## 2025-02-16 PROCEDURE — 63710000001 INSULIN GLARGINE PER 5 UNITS: Performed by: STUDENT IN AN ORGANIZED HEALTH CARE EDUCATION/TRAINING PROGRAM

## 2025-02-16 PROCEDURE — 99233 SBSQ HOSP IP/OBS HIGH 50: CPT | Performed by: INTERNAL MEDICINE

## 2025-02-16 PROCEDURE — 87493 C DIFF AMPLIFIED PROBE: CPT | Performed by: STUDENT IN AN ORGANIZED HEALTH CARE EDUCATION/TRAINING PROGRAM

## 2025-02-16 PROCEDURE — 63710000001 INSULIN LISPRO (HUMAN) PER 5 UNITS: Performed by: PHYSICIAN ASSISTANT

## 2025-02-16 PROCEDURE — 80048 BASIC METABOLIC PNL TOTAL CA: CPT | Performed by: STUDENT IN AN ORGANIZED HEALTH CARE EDUCATION/TRAINING PROGRAM

## 2025-02-16 RX ORDER — BUMETANIDE 1 MG/1
1 TABLET ORAL 2 TIMES DAILY
Status: DISCONTINUED | OUTPATIENT
Start: 2025-02-16 | End: 2025-02-18 | Stop reason: HOSPADM

## 2025-02-16 RX ORDER — DIPHENOXYLATE HYDROCHLORIDE AND ATROPINE SULFATE 2.5; .025 MG/1; MG/1
1 TABLET ORAL
Status: DISCONTINUED | OUTPATIENT
Start: 2025-02-16 | End: 2025-02-18 | Stop reason: HOSPADM

## 2025-02-16 RX ORDER — FENTANYL/ROPIVACAINE/NS/PF 2-625MCG/1
15 PLASTIC BAG, INJECTION (ML) EPIDURAL ONCE
Status: COMPLETED | OUTPATIENT
Start: 2025-02-16 | End: 2025-02-16

## 2025-02-16 RX ADMIN — ATORVASTATIN CALCIUM 80 MG: 40 TABLET, FILM COATED ORAL at 21:38

## 2025-02-16 RX ADMIN — WHITE PETROLATUM 1 APPLICATION: 1.75 OINTMENT TOPICAL at 10:47

## 2025-02-16 RX ADMIN — INSULIN LISPRO 2 UNITS: 100 INJECTION, SOLUTION INTRAVENOUS; SUBCUTANEOUS at 08:52

## 2025-02-16 RX ADMIN — METOPROLOL TARTRATE 100 MG: 50 TABLET, FILM COATED ORAL at 08:53

## 2025-02-16 RX ADMIN — Medication 10 ML: at 21:43

## 2025-02-16 RX ADMIN — BUMETANIDE 1 MG: 1 TABLET ORAL at 21:39

## 2025-02-16 RX ADMIN — INSULIN LISPRO 4 UNITS: 100 INJECTION, SOLUTION INTRAVENOUS; SUBCUTANEOUS at 16:56

## 2025-02-16 RX ADMIN — INSULIN GLARGINE 10 UNITS: 100 INJECTION, SOLUTION SUBCUTANEOUS at 21:40

## 2025-02-16 RX ADMIN — METOPROLOL TARTRATE 100 MG: 50 TABLET, FILM COATED ORAL at 21:38

## 2025-02-16 RX ADMIN — ASPIRIN 81 MG: 81 TABLET, CHEWABLE ORAL at 08:53

## 2025-02-16 RX ADMIN — ACETAMINOPHEN 1000 MG: 500 TABLET ORAL at 12:15

## 2025-02-16 RX ADMIN — APIXABAN 5 MG: 5 TABLET, FILM COATED ORAL at 08:53

## 2025-02-16 RX ADMIN — FINASTERIDE 5 MG: 5 TABLET, FILM COATED ORAL at 08:53

## 2025-02-16 RX ADMIN — APIXABAN 5 MG: 5 TABLET, FILM COATED ORAL at 21:39

## 2025-02-16 RX ADMIN — ACETAMINOPHEN 1000 MG: 500 TABLET ORAL at 21:38

## 2025-02-16 RX ADMIN — Medication 10 ML: at 08:54

## 2025-02-16 RX ADMIN — INSULIN LISPRO 2 UNITS: 100 INJECTION, SOLUTION INTRAVENOUS; SUBCUTANEOUS at 12:15

## 2025-02-16 RX ADMIN — TAMSULOSIN HYDROCHLORIDE 0.8 MG: 0.4 CAPSULE ORAL at 21:39

## 2025-02-16 RX ADMIN — INSULIN LISPRO 4 UNITS: 100 INJECTION, SOLUTION INTRAVENOUS; SUBCUTANEOUS at 21:40

## 2025-02-16 RX ADMIN — POTASSIUM PHOSPHATE, MONOBASIC AND POTASSIUM PHOSPHATE, DIBASIC 15 MMOL: 224; 236 INJECTION, SOLUTION, CONCENTRATE INTRAVENOUS at 16:26

## 2025-02-16 RX ADMIN — OXYCODONE HYDROCHLORIDE 5 MG: 5 TABLET ORAL at 21:38

## 2025-02-16 NOTE — THERAPY TREATMENT NOTE
Acute Care - Physical Therapy Treatment Note   [place]     Patient Name: Avila Parrish  : 1950  MRN: [id number]  Today's Date: 2025      Visit Dx:     ICD-10-CM ICD-9-CM   1. Cerebrovascular accident (CVA), unspecified mechanism  I63.9 434.91   2. Acute intractable headache, unspecified headache type  R51.9 784.0   3. Pleural effusion, right  J90 511.9   4. Decreased activities of daily living (ADL)  Z78.9 V49.89   5. Difficulty in walking  R26.2 719.7   6. Pleural effusion  J90 511.9     Patient Active Problem List   Diagnosis    Colon cancer screening    CVA (cerebral vascular accident)    Pleural effusion    Intractable headache    Essential hypertension    Hyperlipidemia    Type 2 diabetes mellitus with hyperglycemia     Past Medical History:   Diagnosis Date    Arthritis     CHF (congestive heart failure)     Coronary artery disease     Diabetes mellitus     Hyperlipidemia     Hypertension     Sleep apnea      Past Surgical History:   Procedure Laterality Date    COLONOSCOPY      COLONOSCOPY N/A 3/28/2022    Procedure: COLONOSCOPY WITH POLYPECTOMIES;  Surgeon: Tristan Cooper MD;  Location: [facility] ENDOSCOPY;  Service: Gastroenterology;  Laterality: N/A;  COLON POLYPS    TONSILLECTOMY       PT Assessment (Last 12 Hours)       PT Evaluation and Treatment       Row Name 25 0956          Physical Therapy Time and Intention    Subjective Information complains of;fatigue  -SM     Document Type therapy note (daily note)  -SM     Mode of Treatment individual therapy;physical therapy  -SM     Patient Effort fair  -SM     Symptoms Noted During/After Treatment fatigue  -SM       Row Name 25 0956          Cognition    Affect/Mental Status (Cognition) agitated  -SM     Follows Commands (Cognition) repetition of directions required  -SM       Row Name 25 0956          Sit-Stand Transfer    Sit-Stand [place] (Transfers) standby assist;verbal cues  -SM     Assistive Device  (Sit-Stand Transfers) walker, front-wheeled  -SM       Row Name 02/16/25 0956          Stand-Sit Transfer    Stand-Sit [place] (Transfers) standby assist;verbal cues  -     Assistive Device (Stand-Sit Transfers) walker, front-wheeled  -SM       Row Name 02/16/25 0956          Toilet Transfer    Type (Toilet Transfer) sit-stand;stand-sit  -     [place] Level (Toilet Transfer) standby assist;verbal cues  -       Row Name 02/16/25 0956          Gait/Stairs (Locomotion)    Gait/Stairs Locomotion gait/ambulation assistive device  -     [place] Level (Gait) verbal cues;standby assist  -     Assistive Device (Gait) walker, front-wheeled  -     Patient was able to Ambulate yes  -     Distance in Feet (Gait) 10  x2  -     Deviations/Abnormal Patterns (Gait) oriana decreased;gait speed decreased  -     Bilateral Gait Deviations forward flexed posture  -       Row Name 02/16/25 0956          Safety Issues/Impairments Affecting Functional Mobility    Impairments Affecting Function (Mobility) balance;endurance/activity tolerance;strength;shortness of breath  -       Row Name 02/16/25 0956          Balance    Dynamic Standing Balance standby assist;verbal cues  -     Position/Device Used, Standing Balance walker, front-wheeled  -       Row Name             Wound 02/13/25 0150 Right lower leg    Wound - Properties Group Placement Date: 02/13/25  -RF Placement Time: 0150  -RF Side: Right  -RF Orientation: lower  -RF Location: leg  -RF Present on Original Admission: Y  -RF    Retired Wound - Properties Group Placement Date: 02/13/25  -RF Placement Time: 0150  -RF Present on Original Admission: Y  -RF Side: Right  -RF Orientation: lower  -RF Location: leg  -RF    Retired Wound - Properties Group Placement Date: 02/13/25  -RF Placement Time: 0150  -RF Present on Original Admission: Y  -RF Side: Right  -RF Orientation: lower  -RF Location: leg  -RF    Retired Wound - Properties Group Date first  assessed: 02/13/25  -RF Time first assessed: 0150  -RF Present on Original Admission: Y  -RF Side: Right  -RF Location: leg  -RF      Row Name             Wound 02/13/25 0150 Left lower leg    Wound - Properties Group Placement Date: 02/13/25  -RF Placement Time: 0150  -RF Side: Left  -RF Orientation: lower  -RF Location: leg  -RF Present on Original Admission: Y  -RF    Retired Wound - Properties Group Placement Date: 02/13/25  -RF Placement Time: 0150  -RF Present on Original Admission: Y  -RF Side: Left  -RF Orientation: lower  -RF Location: leg  -RF    Retired Wound - Properties Group Placement Date: 02/13/25  -RF Placement Time: 0150  -RF Present on Original Admission: Y  -RF Side: Left  -RF Orientation: lower  -RF Location: leg  -RF    Retired Wound - Properties Group Date first assessed: 02/13/25  -RF Time first assessed: 0150  -RF Present on Original Admission: Y  -RF Side: Left  -RF Location: leg  -RF      Row Name             Wound 02/13/25 0150 Right anterior third toe    Wound - Properties Group Placement Date: 02/13/25  -RF Placement Time: 0150  -RF Side: Right  -RF Orientation: anterior  -RF Location: third toe  -RF Present on Original Admission: Y  -RF    Retired Wound - Properties Group Placement Date: 02/13/25  -RF Placement Time: 0150  -RF Present on Original Admission: Y  -RF Side: Right  -RF Orientation: anterior  -RF Location: third toe  -RF    Retired Wound - Properties Group Placement Date: 02/13/25  -RF Placement Time: 0150  -RF Present on Original Admission: Y  -RF Side: Right  -RF Orientation: anterior  -RF Location: third toe  -RF    Retired Wound - Properties Group Date first assessed: 02/13/25  -RF Time first assessed: 0150  -RF Present on Original Admission: Y  -RF Side: Right  -RF Location: third toe  -RF      Row Name             Wound 02/13/25 0150 Bilateral gluteal    Wound - Properties Group Placement Date: 02/13/25  -RF Placement Time: 0150  -RF Side: Bilateral  -RF Location:  gluteal  -RF Present on Original Admission: Y  -RF    Retired Wound - Properties Group Placement Date: 02/13/25  -RF Placement Time: 0150  -RF Present on Original Admission: Y  -RF Side: Bilateral  -RF Location: gluteal  -RF    Retired Wound - Properties Group Placement Date: 02/13/25  -RF Placement Time: 0150  -RF Present on Original Admission: Y  -RF Side: Bilateral  -RF Location: gluteal  -RF    Retired Wound - Properties Group Date first assessed: 02/13/25  -RF Time first assessed: 0150  -RF Present on Original Admission: Y  -RF Side: Bilateral  -RF Location: gluteal  -RF      Row Name 02/16/25 0956          Positioning and Restraints    Pre-Treatment Position sitting in chair/recliner  -SM     Post Treatment Position chair  -SM     In Chair reclined;call light within reach;encouraged to call for assist;exit alarm on;heels elevated;legs elevated;with nsg  -SM       Row Name 02/16/25 0956          Progress Summary (PT)    Progress Toward Functional Goals (PT) progress toward functional goals is fair  -               User Key  (r) = Recorded By, (t) = Taken By, (c) = Cosigned By      Initials Name Provider Type    Aylin Rosado PTA Physical Therapist Assistant    Sarai Chávez RN Registered Nurse                    Physical Therapy Education       Title: PT OT SLP Therapies (In Progress)       Topic: Physical Therapy (Not Started)       Point: Mobility training (Not Started)       Learner Progress:  Not documented in this visit.              Point: Home exercise program (Not Started)       Learner Progress:  Not documented in this visit.              Point: Body mechanics (Not Started)       Learner Progress:  Not documented in this visit.              Point: Precautions (Not Started)       Learner Progress:  Not documented in this visit.                                  PT Recommendation and Plan     Progress Summary (PT)  Progress Toward Functional Goals (PT): progress toward functional goals is  fair   Outcome Measures       Row Name 02/16/25 0958 02/15/25 1413 02/14/25 1250       How much help from another person do you currently need...    Turning from your back to your side while in flat bed without using bedrails? 4  -SM 4  -SM 4  -SM    Moving from lying on back to sitting on the side of a flat bed without bedrails? 3  -SM 3  -SM 3  -SM    Moving to and from a bed to a chair (including a wheelchair)? 3  -SM 3  -SM 3  -SM    Standing up from a chair using your arms (e.g., wheelchair, bedside chair)? 4  -SM 4  -SM 4  -SM    Climbing 3-5 steps with a railing? 3  -SM 3  -SM 3  -SM    To walk in hospital room? 3  -SM 3  -SM 3  -SM    AM-PAC 6 Clicks Score (PT) 20  -SM 20  -SM 20  -SM      Row Name 02/13/25 1000             How much help from another person do you currently need...    Turning from your back to your side while in flat bed without using bedrails? 3  -LEANNE (r) TM (t) LEANNE (c)      Moving from lying on back to sitting on the side of a flat bed without bedrails? 3  -LEANNE (r) TM (t) LEANNE (c)      Moving to and from a bed to a chair (including a wheelchair)? 3  -LEANNE (r) TM (t) LEANNE (c)      Standing up from a chair using your arms (e.g., wheelchair, bedside chair)? 3  -LEANNE (r) TM (t) LEANNE (c)      Climbing 3-5 steps with a railing? 2  -LEANNE (r) TM (t) LEANNE (c)      To walk in hospital room? 3  -LEANNE (r) TM (t) LEANNE (c)      AM-PAC 6 Clicks Score (PT) 17  -LEANNE (r) TM (t)         Functional Assessment    Outcome Measure Options AM-PAC 6 Clicks Basic Mobility (PT)  -LEANNE (r) TM (t) LEANNE (c)                User Key  (r) = Recorded By, (t) = Taken By, (c) = Cosigned By      Initials Name Provider Type    Carlos Alberto Whittaker, PT Physical Therapist    SM Coronel, Aylin, PTA Physical Therapist Assistant    TM Reed Garay, PT Student PT Student                     Time Calculation:    PT Charges       Row Name 02/16/25 0955             Time Calculation    PT Received On 02/16/25  -BERENICE         Timed Charges    47229 - Gait Training  Minutes  8  -SM      31869 - PT Therapeutic Activity Minutes 20  -SM         Total Minutes    Timed Charges Total Minutes 28  -SM       Total Minutes 28  -SM                User Key  (r) = Recorded By, (t) = Taken By, (c) = Cosigned By      Initials Name Provider Type    Aylin Rosado PTA Physical Therapist Assistant                  Therapy Charges for Today       Code Description Service Date Service Provider Modifiers Qty    93156231976 HC PT THER PROC EA 15 MIN 2/15/2025 Aylin Coronel PTA GP 1    64443505948 HC PT THERAPEUTIC ACT EA 15 MIN 2/15/2025 Aylin Coronel PTA GP 1            PT G-Codes  Outcome Measure Options: AM-PAC 6 Clicks Basic Mobility (PT)  AM-PAC 6 Clicks Score (PT): 20  AM-PAC 6 Clicks Score (OT): 18    Aylin Coronel PTA  2/16/2025

## 2025-02-16 NOTE — PROGRESS NOTES
[facility]   Hospitalist Progress Note  Date: 2025  Patient Name: Avila Parrish  : 1950  MRN: [id number]  Date of admission: 2025  Room/Bed: 213/1      Subjective   Subjective     Chief Complaint:   Chief Complaint   Patient presents with    Headache    Altered Mental Status       Summary:     74 y.o. male who presented with chief complaint of shortness of breath and headache.  He was also described by his daughter to have possible slurring of speech, and altered mental status, and leg weakness.  Last known normal was the night prior.  Patient's daughter spoke to him around 1:30 in the afternoon and reported that he seemed confused and had slurred speech.  He did not present to the emergency department until the evening.  He is on Eliquis for A-fib.  He was evaluated by teleneurology and also had stroke CT package that did not show acute intracranial pathology.     He was admitted for further evaluation management under the care of the hospitalist service.    Interval Followup:     Patient underwent CTA head and neck, MRI brain without contrast and CT perfusion with and without contrast. MRI was negative for acute abnormalities.  Was noted to have incidental large right pleural effusion and neck CTA.  Patient oxygen weaned from 6 L nasal cannula with aggressive diuresis.  He developed acute kidney injury.  On inquiry, patient states he had a pleural effusion 6 weeks ago [facility]  He initially denied having a history of pulmonary nodule, but then stated he was found to have something on his lung and [facility] was [facility] possible [facility].  Patient states he has been slow to schedule this. Dedicated CT chest performed which is notable for a 2.3 x 2.2 cm spiculated right upper lobe nodule and moderate to large right pleural effusion with associated right lower lobe consolidation and atelectasis.  Pulmonologist consulted.  Patient  underwent thoracentesis with 1400 cc fluid removed of serous character.    02/16/25 patient oxygenation continues to improve.  He is hopeful for discharge.  Currently awaiting walking pulse oximetry testing to determine home oxygen level.    Objective   Objective     Vitals:   Temp:  [97.3 °F (36.3 °C)-99.1 °F (37.3 °C)] 98.2 °F (36.8 °C)  Heart Rate:  [84-91] 84  Resp:  [18-20] 20  BP: ()/(65-81) 113/70  Flow (L/min) (Oxygen Therapy):  [1.5] 1.5    Physical Exam   General: Awake, alert, in no acute distress.   HENT: Atraumatic, normocephalic. Nasal cannula in place 1.5 L minute oxygen flow rate  Eyes: pupils equal, round, without scleral icterus  Cardiovascular: Regular rate and rhythm, no murmurs   Pulmonary: CTA bilaterally, no conversational dyspnea  Gastrointestinal: Soft nontender nondistended  Musculoskeletal: Edema improved     Result Review    Result Review:  I have personally reviewed these results:  [x]  Laboratory      Lab 02/15/25  0523 02/14/25  0421 02/12/25  1952   WBC 4.70 4.72 6.84   HEMOGLOBIN 12.2* 12.7* 14.8   HEMATOCRIT 37.0* 39.1 45.3   PLATELETS 95* 102* 130*   NEUTROS ABS 3.25 3.44 5.42   IMMATURE GRANS (ABS) 0.02 0.05 0.06*   LYMPHS ABS 0.61* 0.65* 0.71   MONOS ABS 0.81 0.56 0.63   EOS ABS 0.00 0.01 0.00   MCV 86.4 85.9 86.8   PROTIME  --   --  16.4*   APTT  --   --  34.1         Lab 02/16/25  0643 02/15/25  0523 02/14/25 0421 02/13/25 0513   SODIUM 134* 129* 134*  --    POTASSIUM 3.4* 3.5 3.5  --    CHLORIDE 99 96* 101  --    CO2 20.7* 20.7* 22.3  --    ANION GAP 14.3 12.3 10.7  --    BUN 45* 37* 30*  --    CREATININE 2.43* 2.38* 2.46*  --    EGFR 26.9* 27.6* 26.5*  --    GLUCOSE 166* 186* 153*  --    CALCIUM 7.8* 8.1* 8.5*  --    HEMOGLOBIN A1C  --   --   --  6.90*         Lab 02/12/25 1952   TOTAL PROTEIN 6.6   ALBUMIN 3.5   GLOBULIN 3.1   ALT (SGPT) 6   AST (SGOT) 16   BILIRUBIN 0.7   ALK PHOS 66         Lab 02/15/25  0523 02/12/25 1952   PROBNP 5,401.0*  --    PROTIME  --   16.4*   INR  --  1.27*         Lab 02/13/25  0513   CHOLESTEROL 172   LDL CHOL 106*   HDL CHOL 35*   TRIGLYCERIDES 179*         Lab 02/12/25 2032 02/12/25 1952   ABO TYPING O O   RH TYPING Negative Negative   ANTIBODY SCREEN  --  Negative         Brief Urine Lab Results  (Last result in the past 365 days)        Color   Clarity   Blood   Leuk Est   Nitrite   Protein   CREAT   Urine HCG        02/13/25 0309 Yellow   Clear   Moderate (2+)   Negative   Negative   >=300 mg/dL (3+)                 [x]  Microbiology   Microbiology Results (last 10 days)       Procedure Component Value - Date/Time    Clostridioides difficile Toxin, PCR - Stool, Per Rectum [[id number]] Collected: 02/16/25 0111    Lab Status: Final result Specimen: Stool from Per Rectum Updated: 02/16/25 0208     Toxigenic C. difficile by PCR Negative     027 Toxin Presumptive Negative    Narrative:      The result indicates the absence of toxigenic C. difficile from stool specimen.     AFB Culture - Body Fluid, Pleural Cavity [[id number]] Collected: 02/15/25 1422    Lab Status: Preliminary result Specimen: Body Fluid from Pleural Cavity Updated: 02/16/25 0828     AFB Stain No acid fast bacilli seen on direct smear    Body Fluid Culture - Body Fluid, Pleural Cavity [[id number]] Collected: 02/15/25 1422    Lab Status: Preliminary result Specimen: Body Fluid from Pleural Cavity Updated: 02/15/25 1746     Gram Stain Few (2+) Red blood cells      Rare (1+) WBCs observed - predominantly eosinophils      No organisms seen          [x]  Radiology  XR Chest 1 View    Result Date: 2/15/2025  Impression: Decreased size of now small right sided pleural effusion status post thoracentesis. No pneumothorax. Electronically Signed: Michel Johnson MD  2/15/2025 4:14 PM EST  Workstation ID: [id number]    CT Chest Without Contrast Diagnostic    Result Date: 2/14/2025  1.There is a 2.3 x 2.2 cm spiculated right upper lobe nodule. Malignancy not excluded. Follow-up with a PET/CT  or potentially a follow-up chest CT in 3 months recommended. 2.Moderate to large right pleural effusion. 3.Right lower lobe consolidation may reflect atelectasis and/or pneumonia. There is some probable atelectasis in the right middle lobe as well. 4.Atherosclerotic disease and coronary artery disease. Electronically Signed: Pastor Ambrocio MD  2/14/2025 9:16 PM EST  Workstation ID: [id number]    MRI Brain Without Contrast    Result Date: 2/13/2025  Impression: 1.No acute intracranial abnormality. 2.Chronic microvascular ischemic changes. 3.Diffuse brain atrophy. Electronically Signed: Randall Hoang MD  2/13/2025 8:39 AM EST  Workstation ID: [id number]    CT Angiogram Head w AI Analysis of LVO    Result Date: 2/12/2025  Impression: No proximal large vessel occlusion or major stenosis of the anterior circulation. Diminutive appearing vertebrobasilar circulation with fetal-type bilateral PCAs, which appear grossly patent. The V4 segment of the right vertebral artery is diminutive and is not clearly visualized distally and in the basilar artery appears diminutive and does not appear convincingly opacified in its midportion, unclear whether this is secondary to diminutive vertebrobasilar circulation and/or multifocal severe stenosis/focal occlusions. Recommend correlation with patient's symptoms and consider MRI for further evaluation if clinically warranted. Large right pleural effusion. Right upper lobe pulmonary nodule measuring 2.1 cm. Although this may be infectious/inflammatory, malignancy is not excluded and dedicated chest CT can be considered for further evaluation. Electronically Signed: Elian Mccauley  2/12/2025 9:14 PM EST  Workstation ID: [id number]    CT Angiogram Neck    Result Date: 2/12/2025  Impression: No proximal large vessel occlusion or major stenosis of the anterior circulation. Diminutive appearing vertebrobasilar circulation with fetal-type bilateral PCAs, which appear grossly patent. The V4  segment of the right vertebral artery is diminutive and is not clearly visualized distally and in the basilar artery appears diminutive and does not appear convincingly opacified in its midportion, unclear whether this is secondary to diminutive vertebrobasilar circulation and/or multifocal severe stenosis/focal occlusions. Recommend correlation with patient's symptoms and consider MRI for further evaluation if clinically warranted. Large right pleural effusion. Right upper lobe pulmonary nodule measuring 2.1 cm. Although this may be infectious/inflammatory, malignancy is not excluded and dedicated chest CT can be considered for further evaluation. Electronically Signed: Elian Mccauley  2/12/2025 9:14 PM EST  Workstation ID: [id number]    XR Chest 1 View    Result Date: 2/12/2025  Impression: Interstitial changes likely representing interstitial edema with small right pleural effusion Electronically Signed: Jesus Santiago  2/12/2025 8:35 PM EST  Workstation ID: [id number]    CT CEREBRAL PERFUSION WITH & WITHOUT CONTRAST    Result Date: 2/12/2025   1. Presumed artifactual elevation of Tmax calculations. Given limitations of examination, MRI might be useful to further assess. Electronically Signed: Noel Bob MD  2/12/2025 8:09 PM EST  Workstation ID: [id number]    CT Head Without Contrast Stroke Protocol    Result Date: 2/12/2025  Impression: No acute intracranial findings. Electronically Signed: Elian Mccauley  2/12/2025 7:40 PM EST  Workstation ID: [id number]   []  EKG/Telemetry   []  Cardiology/Vascular   []  Pathology  []  Old records  []  Other:    Assessment & Plan   Assessment / Plan     Assessment     TIA versus stroke    Large pleural effusion    Spiculated lung nodule    Intractable headache    Essential hypertension    Hyperlipidemia    Type 2 diabetes mellitus with hyperglycemia    Acute Chf exacerbation     Plan    -For pulmonary effusion, now status post thoracentesis with improvement in respiratory status.   Appreciate pulmonology.  - Acute hypoxic respiratory failure in setting of above improving, resume p.o. diuresis, begin discharge planning [facility]  -Walking pulse ox testing  -Continue strict ins and outs, daily weights  -Continue CHF protocol  -For possible CVA, neuro recommendations reviewed and appreciated.    -MRI and CTA head and neck negative.    -Clinical evidence of possible acute infarct/TIA.    -Will recommend Holter/loop recorder outpatient on DC.  Continue statin, aspirin  -Hold Eliquis in setting of possible upcoming procedures  -Maintain euglycemia. 10 units detemir at night       Disposition: Possible DC to Home with Home health sooner as later today.  Will likely need home oxygen    Discussed with RN.    VTE Prophylaxis:  Pharmacologic & mechanical VTE prophylaxis orders are present.        CODE STATUS:   Code Status (Patient has no pulse and is not breathing): CPR (Attempt to Resuscitate)  Medical Interventions (Patient has pulse or is breathing): Full Support      Electronically signed by Phill Shelton MD, 2/16/2025, 14:53 EST.

## 2025-02-16 NOTE — PROGRESS NOTES
[facility]     Progress Note    Patient Name: Avila Parrsih  : 1950  MRN: [id number]  Primary Care Physician:  Joaquim Portillo MD  Date of admission: 2025    Subjective   Subjective     Chief Complaint: Reason for consultation pulmonary nodule and effusion    History of Present Illness  Patient Reports improvement in shortness of breath at the pleural effusion  Still dyspneic and weak however    Review of Systems    Objective   Objective     Vitals:   Temp:  [97.3 °F (36.3 °C)-99.1 °F (37.3 °C)] 98.2 °F (36.8 °C)  Heart Rate:  [84-91] 84  Resp:  [18-20] 20  BP: ()/(66-81) 113/70  Flow (L/min) (Oxygen Therapy):  [1.5] 1.5    Physical Exam   Elderly male  Obese  Does have significant edema up past umbilicus  Result Review    Result Review:  I have personally reviewed the results from the time of this admission to 2025 17:01 EST and agree with these findings:  []  Laboratory list / accordion  []  Microbiology  []  Radiology  []  EKG/Telemetry   []  Cardiology/Vascular   []  Pathology  []  Old records  []  Other:  Most notable findings include: CT scan showing pulmonary nodule pleural effusion      Assessment & Plan   Assessment / Plan     Brief Patient Summary:  Avila Parrish is a 74 y.o. male who mated to the hospital found to have fluid overload with pleural effusion    Active Hospital Problems:  Active Hospital Problems    Diagnosis     **CVA (cerebral vascular accident)     Pleural effusion     Intractable headache     Essential hypertension     Hyperlipidemia     Type 2 diabetes mellitus with hyperglycemia      Plan:   Pleural fluid is consistent with transudate  Cytology pending  Discussion had with the patient as well as his daughter today  Patient does have this pulmonary nodule on the right side that is being evaluated at [facility]  They reported that this nodule is not PET avid but has been growing  The [facility] they have offered [facility] biopsy  and he wishes to watch  He reports that he [facility] with [facility] as they have been following this now for at least a year or more  Continue with Bumex as patient still has significant fluid overload  Does have fluid overload with chronic kidney disease  Does not see local nephrologist  May benefit from a nephrology consult so they can formulate a diuretic plan as an outpatient and maybe prevent hospitalization in the future  VTE Prophylaxis:  Pharmacologic & mechanical VTE prophylaxis orders are present.        CODE STATUS:    Code Status (Patient has no pulse and is not breathing): CPR (Attempt to Resuscitate)  Medical Interventions (Patient has pulse or is breathing): Full Support      Robin Billy DO    Electronically signed by Robin Billy DO, 02/16/25, 5:03 PM EST.

## 2025-02-17 PROBLEM — I50.9 CHRONIC CONGESTIVE HEART FAILURE: Status: ACTIVE | Noted: 2025-02-17

## 2025-02-17 PROBLEM — N18.30 CKD (CHRONIC KIDNEY DISEASE), STAGE III: Status: ACTIVE | Noted: 2025-02-17

## 2025-02-17 LAB
CREAT UR-MCNC: 64.1 MG/DL
GLUCOSE BLDC GLUCOMTR-MCNC: 163 MG/DL (ref 70–99)
GLUCOSE BLDC GLUCOMTR-MCNC: 169 MG/DL (ref 70–99)
GLUCOSE BLDC GLUCOMTR-MCNC: 192 MG/DL (ref 70–99)
GLUCOSE BLDC GLUCOMTR-MCNC: 198 MG/DL (ref 70–99)
PROT ?TM UR-MCNC: 310.4 MG/DL
PROT/CREAT UR: 4.84 MG/G{CREAT}

## 2025-02-17 PROCEDURE — 97110 THERAPEUTIC EXERCISES: CPT

## 2025-02-17 PROCEDURE — 82948 REAGENT STRIP/BLOOD GLUCOSE: CPT

## 2025-02-17 PROCEDURE — 82948 REAGENT STRIP/BLOOD GLUCOSE: CPT | Performed by: PHYSICIAN ASSISTANT

## 2025-02-17 PROCEDURE — 99232 SBSQ HOSP IP/OBS MODERATE 35: CPT | Performed by: STUDENT IN AN ORGANIZED HEALTH CARE EDUCATION/TRAINING PROGRAM

## 2025-02-17 PROCEDURE — 97530 THERAPEUTIC ACTIVITIES: CPT

## 2025-02-17 PROCEDURE — 94799 UNLISTED PULMONARY SVC/PX: CPT

## 2025-02-17 PROCEDURE — 82570 ASSAY OF URINE CREATININE: CPT | Performed by: INTERNAL MEDICINE

## 2025-02-17 PROCEDURE — 63710000001 INSULIN GLARGINE PER 5 UNITS: Performed by: STUDENT IN AN ORGANIZED HEALTH CARE EDUCATION/TRAINING PROGRAM

## 2025-02-17 PROCEDURE — 63710000001 INSULIN LISPRO (HUMAN) PER 5 UNITS: Performed by: PHYSICIAN ASSISTANT

## 2025-02-17 PROCEDURE — 84156 ASSAY OF PROTEIN URINE: CPT | Performed by: INTERNAL MEDICINE

## 2025-02-17 RX ORDER — POTASSIUM CHLORIDE 750 MG/1
40 CAPSULE, EXTENDED RELEASE ORAL ONCE
Status: COMPLETED | OUTPATIENT
Start: 2025-02-17 | End: 2025-02-17

## 2025-02-17 RX ORDER — OXYCODONE HYDROCHLORIDE 5 MG/1
10 TABLET ORAL EVERY 8 HOURS PRN
Status: DISCONTINUED | OUTPATIENT
Start: 2025-02-17 | End: 2025-02-18 | Stop reason: HOSPADM

## 2025-02-17 RX ORDER — METOPROLOL TARTRATE 25 MG/1
25 TABLET, FILM COATED ORAL 2 TIMES DAILY
Status: DISCONTINUED | OUTPATIENT
Start: 2025-02-17 | End: 2025-02-18 | Stop reason: HOSPADM

## 2025-02-17 RX ADMIN — ASPIRIN 81 MG: 81 TABLET, CHEWABLE ORAL at 08:46

## 2025-02-17 RX ADMIN — INSULIN LISPRO 2 UNITS: 100 INJECTION, SOLUTION INTRAVENOUS; SUBCUTANEOUS at 08:45

## 2025-02-17 RX ADMIN — Medication 10 ML: at 08:45

## 2025-02-17 RX ADMIN — TAMSULOSIN HYDROCHLORIDE 0.8 MG: 0.4 CAPSULE ORAL at 20:30

## 2025-02-17 RX ADMIN — INSULIN LISPRO 2 UNITS: 100 INJECTION, SOLUTION INTRAVENOUS; SUBCUTANEOUS at 17:00

## 2025-02-17 RX ADMIN — METOPROLOL TARTRATE 25 MG: 25 TABLET, FILM COATED ORAL at 08:47

## 2025-02-17 RX ADMIN — ATORVASTATIN CALCIUM 80 MG: 40 TABLET, FILM COATED ORAL at 20:30

## 2025-02-17 RX ADMIN — DIPHENOXYLATE HYDROCHLORIDE AND ATROPINE SULFATE 1 TABLET: 2.5; .025 TABLET ORAL at 08:46

## 2025-02-17 RX ADMIN — ACETAMINOPHEN 1000 MG: 500 TABLET ORAL at 12:12

## 2025-02-17 RX ADMIN — OXYCODONE HYDROCHLORIDE 10 MG: 5 TABLET ORAL at 22:11

## 2025-02-17 RX ADMIN — INSULIN GLARGINE 10 UNITS: 100 INJECTION, SOLUTION SUBCUTANEOUS at 20:30

## 2025-02-17 RX ADMIN — INSULIN LISPRO 2 UNITS: 100 INJECTION, SOLUTION INTRAVENOUS; SUBCUTANEOUS at 20:30

## 2025-02-17 RX ADMIN — INSULIN LISPRO 2 UNITS: 100 INJECTION, SOLUTION INTRAVENOUS; SUBCUTANEOUS at 12:12

## 2025-02-17 RX ADMIN — BUMETANIDE 1 MG: 1 TABLET ORAL at 20:31

## 2025-02-17 RX ADMIN — APIXABAN 5 MG: 5 TABLET, FILM COATED ORAL at 20:31

## 2025-02-17 RX ADMIN — POTASSIUM CHLORIDE 40 MEQ: 750 CAPSULE, EXTENDED RELEASE ORAL at 08:46

## 2025-02-17 RX ADMIN — FINASTERIDE 5 MG: 5 TABLET, FILM COATED ORAL at 08:47

## 2025-02-17 RX ADMIN — ACETAMINOPHEN 1000 MG: 500 TABLET ORAL at 04:42

## 2025-02-17 RX ADMIN — BUMETANIDE 1 MG: 1 TABLET ORAL at 08:47

## 2025-02-17 RX ADMIN — WHITE PETROLATUM 1 APPLICATION: 1.75 OINTMENT TOPICAL at 14:51

## 2025-02-17 RX ADMIN — ACETAMINOPHEN 1000 MG: 500 TABLET ORAL at 20:30

## 2025-02-17 RX ADMIN — METOPROLOL TARTRATE 25 MG: 25 TABLET, FILM COATED ORAL at 20:30

## 2025-02-17 RX ADMIN — Medication 10 ML: at 20:30

## 2025-02-17 RX ADMIN — APIXABAN 5 MG: 5 TABLET, FILM COATED ORAL at 08:47

## 2025-02-17 NOTE — THERAPY TREATMENT NOTE
Acute Care - Physical Therapy Treatment Note   You     Patient Name: Avila Parrish  : 1950  MRN: [id number]  Today's Date: 2025      Visit Dx:     ICD-10-CM ICD-9-CM   1. Cerebrovascular accident (CVA), unspecified mechanism  I63.9 434.91   2. Acute intractable headache, unspecified headache type  R51.9 784.0   3. Pleural effusion, right  J90 511.9   4. Decreased activities of daily living (ADL)  Z78.9 V49.89   5. Difficulty in walking  R26.2 719.7   6. Pleural effusion  J90 511.9     Patient Active Problem List   Diagnosis    Colon cancer screening    CVA (cerebral vascular accident)    Pleural effusion    Intractable headache    Essential hypertension    Hyperlipidemia    Type 2 diabetes mellitus with hyperglycemia    CKD (chronic kidney disease), stage III    Chronic congestive heart failure     Past Medical History:   Diagnosis Date    Arthritis     CHF (congestive heart failure)     Coronary artery disease     Diabetes mellitus     Hyperlipidemia     Hypertension     Sleep apnea      Past Surgical History:   Procedure Laterality Date    COLONOSCOPY      COLONOSCOPY N/A 3/28/2022    Procedure: COLONOSCOPY WITH POLYPECTOMIES;  Surgeon: Tristan Cooper MD;  Location: [facility] ENDOSCOPY;  Service: Gastroenterology;  Laterality: N/A;  COLON POLYPS    TONSILLECTOMY       PT Assessment (Last 12 Hours)       PT Evaluation and Treatment       Row Name 25 1000          Physical Therapy Time and Intention    Subjective Information complains of;weakness;pain  -RH     Document Type therapy note (daily note)  -RH     Mode of Treatment physical therapy;individual therapy  -RH     Patient Effort fair  -RH       Row Name 25 1000          Pain    Posttreatment Pain Rating 10/10  -RH     Pain Location shoulder  -RH     Pain Side/Orientation right  -RH     Pain Management Interventions diversional activity provided;nursing notified  -RH       Row Name 25 1000          Transfers     Transfers stand pivot/stand step transfer  -       Row Name 02/17/25 1000          Sit-Stand Transfer    Sit-Stand [place] (Transfers) moderate assist (50% patient effort)  -     Assistive Device (Sit-Stand Transfers) walker, front-wheeled  -RH     Comment, (Sit-Stand Transfer) Pt taking only shuffling steps.  -       Row Name 02/17/25 1000          Stand Pivot/Stand Step Transfer    Stand Pivot/Stand Step [place] (Transfers) moderate assist (50% patient effort)  -     Assistive Device (Stand Pivot Stand Step Transfer) walker, front-wheeled  -RH     Comment, (Stand Pivot Transfer) Pt performs pivot transfer x 2 with RW and mod assist with pt requiring help weight-shifting.  Pt with shuffling steps.  -       Row Name 02/17/25 1000          Balance    Dynamic Standing Balance moderate assist  -     Position/Device Used, Standing Balance walker, front-wheeled  -RH       Row Name             Wound 02/13/25 0150 Right lower leg    Wound - Properties Group Placement Date: 02/13/25  -RF Placement Time: 0150  -RF Side: Right  -RF Orientation: lower  -RF Location: leg  -RF Present on Original Admission: Y  -RF    Retired Wound - Properties Group Placement Date: 02/13/25  -RF Placement Time: 0150  -RF Present on Original Admission: Y  -RF Side: Right  -RF Orientation: lower  -RF Location: leg  -RF    Retired Wound - Properties Group Placement Date: 02/13/25  -RF Placement Time: 0150  -RF Present on Original Admission: Y  -RF Side: Right  -RF Orientation: lower  -RF Location: leg  -RF    Retired Wound - Properties Group Date first assessed: 02/13/25  -RF Time first assessed: 0150  -RF Present on Original Admission: Y  -RF Side: Right  -RF Location: leg  -RF      Row Name             Wound 02/13/25 0150 Left lower leg    Wound - Properties Group Placement Date: 02/13/25  -RF Placement Time: 0150  -RF Side: Left  -RF Orientation: lower  -RF Location: leg  -RF Present on Original Admission: Y  -RF    Retired  Wound - Properties Group Placement Date: 02/13/25  -RF Placement Time: 0150  -RF Present on Original Admission: Y  -RF Side: Left  -RF Orientation: lower  -RF Location: leg  -RF    Retired Wound - Properties Group Placement Date: 02/13/25  -RF Placement Time: 0150  -RF Present on Original Admission: Y  -RF Side: Left  -RF Orientation: lower  -RF Location: leg  -RF    Retired Wound - Properties Group Date first assessed: 02/13/25  -RF Time first assessed: 0150  -RF Present on Original Admission: Y  -RF Side: Left  -RF Location: leg  -RF      Row Name             Wound 02/13/25 0150 Right anterior third toe    Wound - Properties Group Placement Date: 02/13/25  -RF Placement Time: 0150  -RF Side: Right  -RF Orientation: anterior  -RF Location: third toe  -RF Present on Original Admission: Y  -RF    Retired Wound - Properties Group Placement Date: 02/13/25  -RF Placement Time: 0150  -RF Present on Original Admission: Y  -RF Side: Right  -RF Orientation: anterior  -RF Location: third toe  -RF    Retired Wound - Properties Group Placement Date: 02/13/25  -RF Placement Time: 0150  -RF Present on Original Admission: Y  -RF Side: Right  -RF Orientation: anterior  -RF Location: third toe  -RF    Retired Wound - Properties Group Date first assessed: 02/13/25  -RF Time first assessed: 0150  -RF Present on Original Admission: Y  -RF Side: Right  -RF Location: third toe  -RF      Row Name             Wound 02/13/25 0150 Bilateral gluteal    Wound - Properties Group Placement Date: 02/13/25  -RF Placement Time: 0150  -RF Side: Bilateral  -RF Location: gluteal  -RF Present on Original Admission: Y  -RF    Retired Wound - Properties Group Placement Date: 02/13/25  -RF Placement Time: 0150  -RF Present on Original Admission: Y  -RF Side: Bilateral  -RF Location: gluteal  -RF    Retired Wound - Properties Group Placement Date: 02/13/25  -RF Placement Time: 0150  -RF Present on Original Admission: Y  -RF Side: Bilateral  -RF Location:  gluteal  -RF    Retired Wound - Properties Group Date first assessed: 02/13/25  -RF Time first assessed: 0150  -RF Present on Original Admission: Y  -RF Side: Bilateral  -RF Location: gluteal  -RF      Row Name 02/17/25 1000          Vital Signs    O2 Delivery Intra Treatment room air  -       Row Name 02/17/25 1000          Positioning and Restraints    In Chair call light within reach;reclined;encouraged to call for assist;exit alarm on  -RH       Row Name 02/17/25 1000          Progress Summary (PT)    Progress Toward Functional Goals (PT) progress toward functional goals is gradual  -               User Key  (r) = Recorded By, (t) = Taken By, (c) = Cosigned By      Initials Name Provider Type     Michel Littlejohn PTA Physical Therapist Assistant     Sarai Levy RN Registered Nurse                Bilateral Lower Extremity   Exercise  Reps  Sets    Short arc quads   10 2   Heel slides  10 2   Ankle pumps  10 2   Quad sets  10 2   Straight leg raise  10 2   Hip ab/adduction 10 2          Physical Therapy Education       Title: PT OT SLP Therapies (In Progress)       Topic: Physical Therapy (Not Started)       Point: Mobility training (Not Started)       Learner Progress:  Not documented in this visit.              Point: Home exercise program (Not Started)       Learner Progress:  Not documented in this visit.              Point: Body mechanics (Not Started)       Learner Progress:  Not documented in this visit.              Point: Precautions (Not Started)       Learner Progress:  Not documented in this visit.                                  PT Recommendation and Plan     Progress Summary (PT)  Progress Toward Functional Goals (PT): progress toward functional goals is gradual   Outcome Measures       Row Name 02/17/25 1100 02/16/25 0958 02/15/25 1413       How much help from another person do you currently need...    Turning from your back to your side while in flat bed without using bedrails? 4  - 4   -SM 4  -SM    Moving from lying on back to sitting on the side of a flat bed without bedrails? 3  -RH 3  -SM 3  -SM    Moving to and from a bed to a chair (including a wheelchair)? 2  -RH 3  -SM 3  -SM    Standing up from a chair using your arms (e.g., wheelchair, bedside chair)? 2  -RH 4  -SM 4  -SM    Climbing 3-5 steps with a railing? 2  -RH 3  -SM 3  -SM    To walk in hospital room? 2  -RH 3  -SM 3  -SM    AM-PAC 6 Clicks Score (PT) 15  -RH 20  -SM 20  -SM      Row Name 02/14/25 1250             How much help from another person do you currently need...    Turning from your back to your side while in flat bed without using bedrails? 4  -SM      Moving from lying on back to sitting on the side of a flat bed without bedrails? 3  -SM      Moving to and from a bed to a chair (including a wheelchair)? 3  -SM      Standing up from a chair using your arms (e.g., wheelchair, bedside chair)? 4  -SM      Climbing 3-5 steps with a railing? 3  -SM      To walk in hospital room? 3  -SM      AM-PAC 6 Clicks Score (PT) 20  -SM                User Key  (r) = Recorded By, (t) = Taken By, (c) = Cosigned By      Initials Name Provider Type     Michel Littlejohn PTA Physical Therapist Assistant     Aylin Coronel PTA Physical Therapist Assistant                     Time Calculation:    PT Charges       Row Name 02/17/25 0908             Time Calculation    PT Received On 02/17/25  -RH         Timed Charges    73365 - PT Therapeutic Exercise Minutes 7  -RH      81352 - PT Therapeutic Activity Minutes 17  -RH         Total Minutes    Timed Charges Total Minutes 24  -RH       Total Minutes 24  -RH                User Key  (r) = Recorded By, (t) = Taken By, (c) = Cosigned By      Initials Name Provider Type     Michel Littlejohn PTA Physical Therapist Assistant                  Therapy Charges for Today       Code Description Service Date Service Provider Modifiers Qty    73330577599 HC PT THER PROC EA 15 MIN 2/17/2025 Michel Littlejohn PTA  GP 1    22873555524 HC PT THERAPEUTIC ACT EA 15 MIN 2/17/2025 Michel Littlejohn, PTA GP 1            PT G-Codes  Outcome Measure Options: AM-PAC 6 Clicks Basic Mobility (PT)  AM-PAC 6 Clicks Score (PT): 15  AM-PAC 6 Clicks Score (OT): 18    Michel Littlejohn PTA  2/17/2025

## 2025-02-17 NOTE — PLAN OF CARE
Goal Outcome Evaluation:           Progress: no change  Outcome Evaluation: No acute changes to patient condition this shift. NIH: 0. No signs or symptoms of distress expressed or observed. Resting in bed with eyes closed and visible respirations.

## 2025-02-17 NOTE — PROGRESS NOTES
[facility]   Hospitalist Progress Note  Date: 2025  Patient Name: Avila Parrish  : 1950  MRN: [id number]  Date of admission: 2025  Room/Bed: 213/1      Subjective   Subjective     Chief Complaint:   Chief Complaint   Patient presents with    Headache    Altered Mental Status       Summary:     74 y.o. male who presented with chief complaint of shortness of breath and headache.  He was also described by his daughter to have possible slurring of speech, and altered mental status, and leg weakness.  Last known normal was the night prior.  Patient's daughter spoke to him around 1:30 in the afternoon and reported that he seemed confused and had slurred speech.  He did not present to the emergency department until the evening.  He is on Eliquis for A-fib.  He was evaluated by teleneurology and also had stroke CT package that did not show acute intracranial pathology.     He was admitted for further evaluation management under the care of the hospitalist service.    Interval Followup:     Patient underwent CTA head and neck, MRI brain without contrast and CT perfusion with and without contrast. MRI was negative for acute abnormalities.  Was noted to have incidental large right pleural effusion and neck CTA.  Patient oxygen weaned from 6 L nasal cannula with aggressive diuresis.  He developed acute kidney injury.  On inquiry, patient states he had a pleural effusion 6 weeks ago [facility].  He initially denied having a history of pulmonary nodule, but then stated he was found to have something on his lung and [facility].  Patient states he has been slow to schedule this. Dedicated CT chest performed which is notable for a 2.3 x 2.2 cm spiculated right upper lobe nodule and moderate to large right pleural effusion with associated right lower lobe consolidation and atelectasis.  Pulmonologist consulted.  Patient  underwent thoracentesis with 1400 cc fluid removed of serous character.    02/17/25 patient oxygenation continues to improve.  He is on room air at rest in room.  Patient and his daughter are now interested in having biopsy of lung nodule now here as an outpatient.    Objective   Objective     Vitals:   Temp:  [97.3 °F (36.3 °C)-98.8 °F (37.1 °C)] 97.7 °F (36.5 °C)  Heart Rate:  [75-81] 79  Resp:  [16-20] 18  BP: ()/(54-82) 109/75  Flow (L/min) (Oxygen Therapy):  [1.5] 1.5    Physical Exam   General: Awake, alert, in no acute distress.   HENT: Atraumatic, normocephalic.  Patient on room air  Eyes: pupils equal, round, without scleral icterus  Cardiovascular: Regular rate and rhythm, no murmurs   Pulmonary: CTA bilaterally, no conversational dyspnea  Gastrointestinal: Soft nontender nondistended  Musculoskeletal: Edema improved     Result Review    Result Review:  I have personally reviewed these results:  [x]  Laboratory      Lab 02/15/25  0523 02/14/25  0421 02/12/25  1952   WBC 4.70 4.72 6.84   HEMOGLOBIN 12.2* 12.7* 14.8   HEMATOCRIT 37.0* 39.1 45.3   PLATELETS 95* 102* 130*   NEUTROS ABS 3.25 3.44 5.42   IMMATURE GRANS (ABS) 0.02 0.05 0.06*   LYMPHS ABS 0.61* 0.65* 0.71   MONOS ABS 0.81 0.56 0.63   EOS ABS 0.00 0.01 0.00   MCV 86.4 85.9 86.8   PROTIME  --   --  16.4*   APTT  --   --  34.1         Lab 02/16/25  0643 02/15/25  0523 02/14/25 0421 02/13/25 0513   SODIUM 134* 129* 134*  --    POTASSIUM 3.4* 3.5 3.5  --    CHLORIDE 99 96* 101  --    CO2 20.7* 20.7* 22.3  --    ANION GAP 14.3 12.3 10.7  --    BUN 45* 37* 30*  --    CREATININE 2.43* 2.38* 2.46*  --    EGFR 26.9* 27.6* 26.5*  --    GLUCOSE 166* 186* 153*  --    CALCIUM 7.8* 8.1* 8.5*  --    HEMOGLOBIN A1C  --   --   --  6.90*         Lab 02/12/25 1952   TOTAL PROTEIN 6.6   ALBUMIN 3.5   GLOBULIN 3.1   ALT (SGPT) 6   AST (SGOT) 16   BILIRUBIN 0.7   ALK PHOS 66         Lab 02/15/25  0523 02/12/25 1952   PROBNP 5,401.0*  --    PROTIME  --  16.4*    INR  --  1.27*         Lab 02/13/25  0513   CHOLESTEROL 172   LDL CHOL 106*   HDL CHOL 35*   TRIGLYCERIDES 179*         Lab 02/12/25 2032 02/12/25 1952   ABO TYPING O O   RH TYPING Negative Negative   ANTIBODY SCREEN  --  Negative         Brief Urine Lab Results  (Last result in the past 365 days)        Color   Clarity   Blood   Leuk Est   Nitrite   Protein   CREAT   Urine HCG        02/13/25 0309 Yellow   Clear   Moderate (2+)   Negative   Negative   >=300 mg/dL (3+)                 [x]  Microbiology   Microbiology Results (last 10 days)       Procedure Component Value - Date/Time    Clostridioides difficile Toxin, PCR - Stool, Per Rectum [[id number]] Collected: 02/16/25 0111    Lab Status: Final result Specimen: Stool from Per Rectum Updated: 02/16/25 0208     Toxigenic C. difficile by PCR Negative     027 Toxin Presumptive Negative    Narrative:      The result indicates the absence of toxigenic C. difficile from stool specimen.     AFB Culture - Body Fluid, Pleural Cavity [[id number]] Collected: 02/15/25 1422    Lab Status: Preliminary result Specimen: Body Fluid from Pleural Cavity Updated: 02/16/25 0828     AFB Stain No acid fast bacilli seen on direct smear    Body Fluid Culture - Body Fluid, Pleural Cavity [[id number]] Collected: 02/15/25 1422    Lab Status: Preliminary result Specimen: Body Fluid from Pleural Cavity Updated: 02/17/25 0820     Body Fluid Culture No growth     Gram Stain Few (2+) Red blood cells      Rare (1+) WBCs observed - predominantly eosinophils      No organisms seen          [x]  Radiology  XR Chest 1 View    Result Date: 2/15/2025  Impression: Decreased size of now small right sided pleural effusion status post thoracentesis. No pneumothorax. Electronically Signed: Michel Johnson MD  2/15/2025 4:14 PM EST  Workstation ID: [id number]    CT Chest Without Contrast Diagnostic    Result Date: 2/14/2025  1.There is a 2.3 x 2.2 cm spiculated right upper lobe nodule. Malignancy not  excluded. Follow-up with a PET/CT or potentially a follow-up chest CT in 3 months recommended. 2.Moderate to large right pleural effusion. 3.Right lower lobe consolidation may reflect atelectasis and/or pneumonia. There is some probable atelectasis in the right middle lobe as well. 4.Atherosclerotic disease and coronary artery disease. Electronically Signed: Pastor Ambrocio MD  2/14/2025 9:16 PM EST  Workstation ID: [id number]    MRI Brain Without Contrast    Result Date: 2/13/2025  Impression: 1.No acute intracranial abnormality. 2.Chronic microvascular ischemic changes. 3.Diffuse brain atrophy. Electronically Signed: Randall Hoang MD  2/13/2025 8:39 AM EST  Workstation ID: [id number]    CT Angiogram Head w AI Analysis of LVO    Result Date: 2/12/2025  Impression: No proximal large vessel occlusion or major stenosis of the anterior circulation. Diminutive appearing vertebrobasilar circulation with fetal-type bilateral PCAs, which appear grossly patent. The V4 segment of the right vertebral artery is diminutive and is not clearly visualized distally and in the basilar artery appears diminutive and does not appear convincingly opacified in its midportion, unclear whether this is secondary to diminutive vertebrobasilar circulation and/or multifocal severe stenosis/focal occlusions. Recommend correlation with patient's symptoms and consider MRI for further evaluation if clinically warranted. Large right pleural effusion. Right upper lobe pulmonary nodule measuring 2.1 cm. Although this may be infectious/inflammatory, malignancy is not excluded and dedicated chest CT can be considered for further evaluation. Electronically Signed: Elian Mccauley  2/12/2025 9:14 PM EST  Workstation ID: [id number]    CT Angiogram Neck    Result Date: 2/12/2025  Impression: No proximal large vessel occlusion or major stenosis of the anterior circulation. Diminutive appearing vertebrobasilar circulation with fetal-type bilateral PCAs, which  appear grossly patent. The V4 segment of the right vertebral artery is diminutive and is not clearly visualized distally and in the basilar artery appears diminutive and does not appear convincingly opacified in its midportion, unclear whether this is secondary to diminutive vertebrobasilar circulation and/or multifocal severe stenosis/focal occlusions. Recommend correlation with patient's symptoms and consider MRI for further evaluation if clinically warranted. Large right pleural effusion. Right upper lobe pulmonary nodule measuring 2.1 cm. Although this may be infectious/inflammatory, malignancy is not excluded and dedicated chest CT can be considered for further evaluation. Electronically Signed: Elian Mccauley  2/12/2025 9:14 PM EST  Workstation ID: [id number]    XR Chest 1 View    Result Date: 2/12/2025  Impression: Interstitial changes likely representing interstitial edema with small right pleural effusion Electronically Signed: Jesus Santiago  2/12/2025 8:35 PM EST  Workstation ID: [id number]    CT CEREBRAL PERFUSION WITH & WITHOUT CONTRAST    Result Date: 2/12/2025   1. Presumed artifactual elevation of Tmax calculations. Given limitations of examination, MRI might be useful to further assess. Electronically Signed: Noel Bob MD  2/12/2025 8:09 PM EST  Workstation ID: [id number]    CT Head Without Contrast Stroke Protocol    Result Date: 2/12/2025  Impression: No acute intracranial findings. Electronically Signed: Elian Mccauley  2/12/2025 7:40 PM EST  Workstation ID: [id number]   []  EKG/Telemetry   []  Cardiology/Vascular   []  Pathology  []  Old records  []  Other:    Assessment & Plan   Assessment / Plan     Assessment     TIA versus stroke    Large pleural effusion    Spiculated lung nodule    Intractable headache    Essential hypertension    Hyperlipidemia    Type 2 diabetes mellitus with hyperglycemia    Acute Chf exacerbation     Plan    -For pulmonary effusion, now status post thoracentesis with  improvement in respiratory status.  Appreciate pulmonology.  -Acute hypoxic respiratory failure in setting of above improving, continue p.o. diuresis, fluid and salt restrictions  -For MARILY, CKD, nephrology consulted.  Appreciate recommendations  -Walking pulse ox testing on DC  -Continue strict ins and outs, daily weights  -Continue CHF protocol  -For possible CVA, possible TIA versus subacute infarct per neurology.  Likely altered in setting of hypoxia.  Neuro recommendations reviewed and appreciated.    -MRI and CTA head and neck negative.    -Will recommend Holter/loop recorder outpatient on DC.  Continue statin, aspirin  -Maintain euglycemia. 10 units detemir at night   -Holding hydralazine, lisinopril in setting of soft blood pressure      Disposition: Patient now wants pulmonary nodule biopsy here.  Will go to home with home health on discharge    Discussed with RN.    VTE Prophylaxis:  Pharmacologic & mechanical VTE prophylaxis orders are present.        CODE STATUS:   Code Status (Patient has no pulse and is not breathing): CPR (Attempt to Resuscitate)  Medical Interventions (Patient has pulse or is breathing): Full Support      Electronically signed by Phill Shelton MD, 2/17/2025, 12:56 EST.

## 2025-02-17 NOTE — PROGRESS NOTES
Pulmonary / Critical Care Progress Note      Patient Name: Avila Parrish  : 1950  MRN: [id number]  Attending:  Phill Shelton MD  Date of admission: 2025    Subjective   Subjective   Follow-up for pleural effusion    Over past 24 hours:  Doing fair today  On room air  Reports feeling better after thoracentesis  Lower extremity edema improved, wrapped with dressing in place    Review of Systems  General: Denied complaints  Cardiovascular:  Denied complaints  Respiratory: + Shortness of breath; denied complaints  Gastrointestinal: Denied complaints        Objective   Objective     Vitals:   Temp:  [97.3 °F (36.3 °C)-98.8 °F (37.1 °C)] 97.7 °F (36.5 °C)  Heart Rate:  [75-81] 79  Resp:  [16-20] 18  BP: ()/(54-82) 109/75  Flow (L/min) (Oxygen Therapy):  [1.5] 1.5    Physical Exam   Vital Signs Reviewed   General:  WDWN, Alert, NAD.  Sitting up in chair;  HEENT:  PERRL, EOMI.  OP, nares clear  Chest:  good aeration, clear to auscultation bilaterally, no work of breathing noted on room air  CV: RRR, no MGR, pulses 2+, equal.  Abd:  Soft, NT, ND, + BS, obese  EXT:  no clubbing, no cyanosis, no edema  Neuro:  A&Ox3, CN grossly intact, no focal deficits.  Skin: No rashes or lesions noted      Result Review    Result Review:  I have personally reviewed the results from the time of this admission to 2025 14:43 EST and agree with these findings:  []  Laboratory  []  Microbiology  []  Radiology  []  EKG/Telemetry   []  Cardiology/Vascular   []  Pathology  []  Old records  []  Other:  Most notable findings include:   -     Assessment & Plan   Assessment / Plan     Active Hospital Problems:  Active Hospital Problems    Diagnosis     **CVA (cerebral vascular accident)     CKD (chronic kidney disease), stage III     Chronic congestive heart failure     Pleural effusion     Intractable headache     Essential hypertension     Hyperlipidemia     Type 2 diabetes mellitus with hyperglycemia           Impression:  Right-sided pleural effusion status post thoracentesis  Pulmonary edema  CKD  Hypertension  Type 2 diabetes  Hypokalemia  A-fib on Eliquis      Plan:  Currently on room air  Status post thoracentesis 2/15  Pleural effusion consistent with transudative in nature. Cytology pending  [facility] with his pulmonary [facility].  Per patient, he was told that this nodule was not PET positive but has been growing.  He was offered biopsy but patient wanted to monitor.  [facility].  Continue diuresis tolerated.  Patient does have volume overload with CKD.  Renal on board.  Pulmonary will sign off for now.  Please call with questions    VTE Prophylaxis:  Pharmacologic & mechanical VTE prophylaxis orders are present.        CODE STATUS:   Code Status (Patient has no pulse and is not breathing): CPR (Attempt to Resuscitate)  Medical Interventions (Patient has pulse or is breathing): Full Support      Labs, images, and medications personally reviewed.    Discussed with patient    Electronically signed by Godfrey Caputo MD, 02/17/25, 2:43 PM EST.

## 2025-02-17 NOTE — PLAN OF CARE
Goal Outcome Evaluation:  Plan of Care Reviewed With: patient, family           Outcome Evaluation: Started on 1500ml fluid restriction.

## 2025-02-17 NOTE — CONSULTS
[facility]   Consult Note    Patient Name: Avila Parrish  : 1950  MRN: [id number]  Primary Care Physician:  Joaquim Portillo MD  Referring Physician: No ref. provider found  Date of admission: 2025    Subjective   Subjective     Reason for Consult/ Chief Complaint: CKD stage III    HPI:  Avila Parrish is a 74 y.o. male with past medical history significant for atherosclerosis diabetes mellitus chronic congestive heart failure essential hypertension sleep apnea obesity proteinuria lower extremity ulcers and stasis dermatitis was admitted to the hospital on  with headache altered mental status and slurring of speech..  Stroke was ruled out and I was requested to see this patient as his baseline creatinine apparently is around 2 and it was getting worse.  When I see the patient this morning he is fully awake alert responsive interactive not in any acute distress and is getting physical therapy  Patient has been on diuretics  Review of Systems  All review of systems negative except as given below.    Personal History     Past Medical History:   Diagnosis Date    Arthritis     CHF (congestive heart failure)     Coronary artery disease     Diabetes mellitus     Hyperlipidemia     Hypertension     Sleep apnea        Past Surgical History:   Procedure Laterality Date    COLONOSCOPY      COLONOSCOPY N/A 3/28/2022    Procedure: COLONOSCOPY WITH POLYPECTOMIES;  Surgeon: Tristan Cooper MD;  Location: [facility] ENDOSCOPY;  Service: Gastroenterology;  Laterality: N/A;  COLON POLYPS    TONSILLECTOMY         Family History: family history is not on file. Otherwise pertinent FHx was reviewed and not pertinent to current issue.    Social History:  reports that he quit smoking about 4 months ago. His smoking use included cigars and cigarettes. He has a 1.3 pack-year smoking history. He has never used smokeless tobacco. He reports that he does not currently use alcohol. Drug use questions  deferred to the physician.    Home Medications:  Tirzepatide, apixaban, atorvastatin, baclofen, bumetanide, cholecalciferol, finasteride, furosemide, hydrALAZINE, insulin aspart prot-insulin aspart, latanoprost, lisinopril, metFORMIN ER, metoprolol tartrate, mupirocin, potassium chloride, tamsulosin, and trospium    Allergies:  No Known Allergies    Objective    Objective     Vitals:   Temp:  [97.3 °F (36.3 °C)-98.8 °F (37.1 °C)] 97.7 °F (36.5 °C)  Heart Rate:  [75-81] 81  Resp:  [16-20] 18  BP: ()/(54-82) 112/78  Flow (L/min) (Oxygen Therapy):  [1.5] 1.5    Physical Exam:             Constitutional:         Awake, alert responsive, conversant, no obvious distress   Eyes:                       PERRLA, sclerae anicteric, no conjunctival injection   HEENT:                   Moist mucous membranes, no nasal or eye discharge, no throat congestion   Neck:                      Supple, no thyromegaly, no lymphadenopathy, trachea midline, no elevated JVD   Respiratory:           Clear to auscultation bilaterally, nonlabored respirations    Cardiovascular:     RRR, no murmurs, rubs, or gallops, palpable pedal pulses bilaterally,  bilateral ankle edema   Gastrointestinal:   Positive bowel sounds, soft, nontender, non-distended, no organomegaly   Musculoskeletal:  No clubbing or cyanosis to extremities, muscle wasting, joint swelling, muscle weakness   Psychiatric:              Appropriate affect, cooperative   Neurologic:            Awake alert, oriented x 3, strength symmetric in all extremities, Cranial Nerves grossly intact to confrontation, speech clear   Skin:                      No rashes, bruising, skin ulcers, petechiae or ecchymosis    Result Review    Result Review:  I have personally reviewed the results from the time of this admission to 2/17/2025 08:22 EST and agree with these findings:  []  Laboratory  []  Microbiology  []  Radiology  []  EKG/Telemetry   []  Cardiology/Vascular   []  Pathology  []  Old  records  []  Other:    Results from last 7 days   Lab Units 02/15/25  0523 02/14/25  0421 02/12/25 1952   WBC 10*3/mm3 4.70 4.72 6.84   HEMOGLOBIN g/dL 12.2* 12.7* 14.8   PLATELETS 10*3/mm3 95* 102* 130*     Results from last 7 days   Lab Units 02/16/25  0643 02/15/25  0523 02/14/25 0421 02/12/25 1952   SODIUM mmol/L 134* 129* 134* 138   POTASSIUM mmol/L 3.4* 3.5 3.5 4.0   CHLORIDE mmol/L 99 96* 101 102   CO2 mmol/L 20.7* 20.7* 22.3 22.9   ANION GAP mmol/L 14.3 12.3 10.7 13.1   BUN mg/dL 45* 37* 30* 15   CREATININE mg/dL 2.43* 2.38* 2.46* 1.95*   GLUCOSE mg/dL 166* 186* 153* 150*   EGFR mL/min/1.73 26.9* 27.6* 26.5* 35.0*   CALCIUM mg/dL 7.8* 8.1* 8.5* 9.5   BILIRUBIN mg/dL  --   --   --  0.7   ALK PHOS U/L  --   --   --  66   ALT (SGPT) U/L  --   --   --  6   AST (SGOT) U/L  --   --   --  16       Assessment & Plan   Assessment / Plan     Active Hospital Problems:  Active Hospital Problems    Diagnosis     **CVA (cerebral vascular accident)     CKD (chronic kidney disease), stage III     Chronic congestive heart failure     Pleural effusion     Intractable headache     Essential hypertension     Hyperlipidemia     Type 2 diabetes mellitus with hyperglycemia      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

## 2025-02-18 ENCOUNTER — APPOINTMENT (OUTPATIENT)
Dept: GENERAL RADIOLOGY | Facility: HOSPITAL | Age: 75
DRG: 291 | End: 2025-02-18
Payer: OTHER GOVERNMENT

## 2025-02-18 ENCOUNTER — READMISSION MANAGEMENT (OUTPATIENT)
Dept: CALL CENTER | Facility: HOSPITAL | Age: 75
End: 2025-02-18
Payer: OTHER GOVERNMENT

## 2025-02-18 VITALS
TEMPERATURE: 98.2 F | DIASTOLIC BLOOD PRESSURE: 65 MMHG | HEART RATE: 93 BPM | WEIGHT: 297.18 LBS | BODY MASS INDEX: 38.14 KG/M2 | SYSTOLIC BLOOD PRESSURE: 118 MMHG | OXYGEN SATURATION: 97 % | RESPIRATION RATE: 18 BRPM | HEIGHT: 74 IN

## 2025-02-18 PROBLEM — I50.33 ACUTE ON CHRONIC HEART FAILURE WITH PRESERVED EJECTION FRACTION (HFPEF): Status: ACTIVE | Noted: 2025-02-18

## 2025-02-18 LAB
ALBUMIN SERPL-MCNC: 2.7 G/DL (ref 3.5–5.2)
ANION GAP SERPL CALCULATED.3IONS-SCNC: 12.6 MMOL/L (ref 5–15)
ARTERIAL PATENCY WRIST A: POSITIVE
ATMOSPHERIC PRESS: 754.1 MMHG
BASE EXCESS BLDA CALC-SCNC: -0.1 MMOL/L (ref -2–2)
BDY SITE: ABNORMAL
BUN SERPL-MCNC: 40 MG/DL (ref 8–23)
BUN/CREAT SERPL: 16.9 (ref 7–25)
CALCIUM SPEC-SCNC: 8.1 MG/DL (ref 8.6–10.5)
CHLORIDE SERPL-SCNC: 102 MMOL/L (ref 98–107)
CO2 SERPL-SCNC: 23.4 MMOL/L (ref 22–29)
CREAT SERPL-MCNC: 2.36 MG/DL (ref 0.76–1.27)
CYTO UR: NORMAL
EGFRCR SERPLBLD CKD-EPI 2021: 27.8 ML/MIN/1.73
GLUCOSE BLDC GLUCOMTR-MCNC: 150 MG/DL (ref 70–99)
GLUCOSE BLDC GLUCOMTR-MCNC: 197 MG/DL (ref 70–99)
GLUCOSE SERPL-MCNC: 156 MG/DL (ref 65–99)
HCO3 BLDA-SCNC: 23.9 MMOL/L (ref 22–26)
HCT VFR BLD CALC: 41 % (ref 38–51)
HEMODILUTION: NO
HGB BLDA-MCNC: 14 G/DL (ref 12–18)
LAB AP CASE REPORT: NORMAL
LAB AP CLINICAL INFORMATION: NORMAL
MODALITY: ABNORMAL
PATH REPORT.FINAL DX SPEC: NORMAL
PATH REPORT.GROSS SPEC: NORMAL
PCO2 BLDA: 36.3 MM HG (ref 35–45)
PH BLDA: 7.43 PH UNITS (ref 7.35–7.45)
PHOSPHATE SERPL-MCNC: 2.8 MG/DL (ref 2.5–4.5)
PO2 BLDA: 65.6 MM HG (ref 80–100)
POTASSIUM SERPL-SCNC: 3.7 MMOL/L (ref 3.5–5.2)
SAO2 % BLDCOA: 93.2 % (ref 95–99)
SODIUM SERPL-SCNC: 138 MMOL/L (ref 136–145)
WHOLE BLOOD HOLD SPECIMEN: NORMAL

## 2025-02-18 PROCEDURE — 82948 REAGENT STRIP/BLOOD GLUCOSE: CPT | Performed by: PHYSICIAN ASSISTANT

## 2025-02-18 PROCEDURE — 63710000001 INSULIN LISPRO (HUMAN) PER 5 UNITS: Performed by: PHYSICIAN ASSISTANT

## 2025-02-18 PROCEDURE — 94761 N-INVAS EAR/PLS OXIMETRY MLT: CPT

## 2025-02-18 PROCEDURE — 99238 HOSP IP/OBS DSCHRG MGMT 30/<: CPT | Performed by: STUDENT IN AN ORGANIZED HEALTH CARE EDUCATION/TRAINING PROGRAM

## 2025-02-18 PROCEDURE — 94799 UNLISTED PULMONARY SVC/PX: CPT

## 2025-02-18 PROCEDURE — 80069 RENAL FUNCTION PANEL: CPT | Performed by: STUDENT IN AN ORGANIZED HEALTH CARE EDUCATION/TRAINING PROGRAM

## 2025-02-18 PROCEDURE — 36600 WITHDRAWAL OF ARTERIAL BLOOD: CPT

## 2025-02-18 PROCEDURE — 82803 BLOOD GASES ANY COMBINATION: CPT

## 2025-02-18 PROCEDURE — 94618 PULMONARY STRESS TESTING: CPT

## 2025-02-18 PROCEDURE — 73030 X-RAY EXAM OF SHOULDER: CPT

## 2025-02-18 RX ORDER — LISINOPRIL 10 MG/1
10 TABLET ORAL
Qty: 30 TABLET | Refills: 0 | Status: SHIPPED | OUTPATIENT
Start: 2025-02-19

## 2025-02-18 RX ORDER — LISINOPRIL 10 MG/1
10 TABLET ORAL
Status: DISCONTINUED | OUTPATIENT
Start: 2025-02-18 | End: 2025-02-18 | Stop reason: HOSPADM

## 2025-02-18 RX ORDER — ASPIRIN 81 MG/1
81 TABLET, CHEWABLE ORAL DAILY
Qty: 30 TABLET | Refills: 0 | Status: SHIPPED | OUTPATIENT
Start: 2025-02-19 | End: 2025-02-18 | Stop reason: HOSPADM

## 2025-02-18 RX ORDER — ASPIRIN 81 MG/1
81 TABLET ORAL DAILY
Qty: 30 TABLET | Refills: 0 | Status: SHIPPED | OUTPATIENT
Start: 2025-02-18

## 2025-02-18 RX ORDER — METOPROLOL TARTRATE 25 MG/1
25 TABLET, FILM COATED ORAL 2 TIMES DAILY
Qty: 60 TABLET | Refills: 0 | Status: SHIPPED | OUTPATIENT
Start: 2025-02-18

## 2025-02-18 RX ADMIN — WHITE PETROLATUM 1 APPLICATION: 1.75 OINTMENT TOPICAL at 10:11

## 2025-02-18 RX ADMIN — APIXABAN 5 MG: 5 TABLET, FILM COATED ORAL at 08:44

## 2025-02-18 RX ADMIN — INSULIN LISPRO 2 UNITS: 100 INJECTION, SOLUTION INTRAVENOUS; SUBCUTANEOUS at 12:53

## 2025-02-18 RX ADMIN — INSULIN LISPRO 2 UNITS: 100 INJECTION, SOLUTION INTRAVENOUS; SUBCUTANEOUS at 08:43

## 2025-02-18 RX ADMIN — ACETAMINOPHEN 1000 MG: 500 TABLET ORAL at 12:03

## 2025-02-18 RX ADMIN — METOPROLOL TARTRATE 25 MG: 25 TABLET, FILM COATED ORAL at 08:44

## 2025-02-18 RX ADMIN — ASPIRIN 81 MG: 81 TABLET, CHEWABLE ORAL at 08:44

## 2025-02-18 RX ADMIN — Medication 10 ML: at 08:44

## 2025-02-18 RX ADMIN — LISINOPRIL 10 MG: 10 TABLET ORAL at 10:10

## 2025-02-18 RX ADMIN — FINASTERIDE 5 MG: 5 TABLET, FILM COATED ORAL at 08:44

## 2025-02-18 RX ADMIN — ACETAMINOPHEN 1000 MG: 500 TABLET ORAL at 04:47

## 2025-02-18 RX ADMIN — BUMETANIDE 1 MG: 1 TABLET ORAL at 08:44

## 2025-02-18 NOTE — PROGRESS NOTES
[facility]     Progress Note    Patient Name: Avila Parrish  : 1950  MRN: [id number]  Primary Care Physician:  Joaquim Portillo MD  Date of admission: 2025    Subjective patient fully awake alert responsive he is complaining of severe joint stiffness to a point where he is having difficulty getting out of the chair    Review of Systems  All review of systems are negative except as mentioned in subjective complaints.    Objective   Objective     Vitals:   Temp:  [97.3 °F (36.3 °C)-99 °F (37.2 °C)] 97.3 °F (36.3 °C)  Heart Rate:  [79-99] 97  Resp:  [18] 18  BP: (109-144)/(71-89) 135/82  Physical Exam    Constitutional: Awake, alert responsive, conversant, no obvious distress              Psychiatric:  Appropriate affect, cooperative   Neurologic:  Awake alert ,oriented x 3, strength symmetric in all extremities, Cranial Nerves grossly intact to confrontation, speech clear   Eyes:   PERRLA, sclerae anicteric, no conjunctival injection   HEENT:  Moist mucous membranes, no nasal or eye discharge, no throat congestion   Neck:   Supple, no thyromegaly, no lymphadenopathy, trachea midline, no elevated JVD   Respiratory:  Clear to auscultation bilaterally, nonlabored respirations    Cardiovascular: RRR, no murmurs, rubs, or gallops, palpable pedal pulses bilaterally, bilateral ankle edema   Gastrointestinal: Positive bowel sounds, soft, nontender, nondistended, no organomegaly   Musculoskeletal:  No clubbing or cyanosis to extremities,muscle wasting, joint swelling, muscle weakness             Skin:                      No rashes, bruising, skin ulcers, petechiae or ecchymosis    Result Review    Result Review:  I have personally reviewed the results from the time of this admission to 2025 08:40 EST and agree with these findings:  []  Laboratory  []  Microbiology  []  Radiology  []  EKG/Telemetry   []  Cardiology/Vascular   []  Pathology  []  Old records  []  Other:    Results from last 7 days   Lab  Units 02/15/25  0523 02/14/25 0421 02/12/25 1952   WBC 10*3/mm3 4.70 4.72 6.84   HEMOGLOBIN g/dL 12.2* 12.7* 14.8   PLATELETS 10*3/mm3 95* 102* 130*     Results from last 7 days   Lab Units 02/16/25  0643 02/15/25  0523 02/14/25 0421 02/12/25 1952   SODIUM mmol/L 134* 129* 134* 138   POTASSIUM mmol/L 3.4* 3.5 3.5 4.0   CHLORIDE mmol/L 99 96* 101 102   CO2 mmol/L 20.7* 20.7* 22.3 22.9   ANION GAP mmol/L 14.3 12.3 10.7 13.1   BUN mg/dL 45* 37* 30* 15   CREATININE mg/dL 2.43* 2.38* 2.46* 1.95*   GLUCOSE mg/dL 166* 186* 153* 150*   EGFR mL/min/1.73 26.9* 27.6* 26.5* 35.0*   CALCIUM mg/dL 7.8* 8.1* 8.5* 9.5   BILIRUBIN mg/dL  --   --   --  0.7   ALK PHOS U/L  --   --   --  66   ALT (SGPT) U/L  --   --   --  6   AST (SGOT) U/L  --   --   --  16       Assessment & Plan   Assessment / Plan       Active Hospital Problems:    Active Hospital Problems    Diagnosis  POA    **CVA (cerebral vascular accident) [I63.9]  Yes    CKD (chronic kidney disease), stage III [N18.30]  Unknown     Probably secondary to hypertension diabetes induced nephrosclerosis      Chronic congestive heart failure [I50.9]  Unknown    Pleural effusion [J90]  Unknown    Intractable headache [R51.9]  Unknown    Essential hypertension [I10]  Unknown    Hyperlipidemia [E78.5]  Unknown    Type 2 diabetes mellitus with hyperglycemia [E11.65]  Unknown     5 g proteinuria  Creatinine around 2.5 mg acceptable  Patient has diabetic nephropathy  He should be on SGLT2 inhibitors, ACE/ARB         Plan:   Fluid and salt restriction  Judicious use of diuretics  We will follow-up patient as outpatient once discharged      Electronically signed by Lore Perez MD, 02/18/25, 8:36 AM EST.

## 2025-02-18 NOTE — DISCHARGE INSTR - APPOINTMENTS
Patient will need to follow up with Nephrology. (Office was already closed)  Patient will need to follow up with PCP.   lateral

## 2025-02-18 NOTE — PLAN OF CARE
Goal Outcome Evaluation:                         Pt discharging home with HH, accompanied by family member. IV and telemetry dc'ed.

## 2025-02-18 NOTE — PROGRESS NOTES
Walking Oximetry Progress Note      Patient Name:  Avila Parrish  YOB: 1950  Date of Procedure: 02/18/25              ROOM AIR BASELINE   SpO2% 96   Heart Rate 91     EXERCISE ON ROOM AIR SpO2% EXERCISE ON O2 LPM SpO2%   1 MINUTE 96 1 MINUTE     2 MINUTES 94 2 MINUTES     3 MINUTES 94 3 MINUTES     4 MINUTES 93 4 MINUTES     5 MINUTES 94 5 MINUTES     6 MINUTES 94 6 MINUTES                Time to Recovery  5 Minute   SpO2% Post Exercise  95% on room air.    HR Post Exercise  94     Comments:           Electronically signed by Ericka Dobbins CRT, 02/18/25, 9:54 AM EST.

## 2025-02-18 NOTE — PLAN OF CARE
Goal Outcome Evaluation:  Plan of Care Reviewed With: patient        Progress: no change  Outcome Evaluation: No acute changes to patient condition this shift. Patient administered PRN medication for pain of Rt hip and shoulder. Encouraged throughout shift to move to bed for more mobility of body parts and ability to alternate positions and not stay on pressure points for prolonged periods of time. With assistance of 3 staff members we were able to assist patient to bed successfully. Bed alarm set and use of call light reinforced. Resting in bed with eyes closed and visible respirations.

## 2025-02-18 NOTE — DISCHARGE SUMMARY
[facility]         HOSPITALIST  DISCHARGE SUMMARY    Patient Name: Avila Parrish  : 1950  MRN: [id number]    Date of Admission: 2025  Date of Discharge:  2025  Primary Care Physician: Joaquim Portillo MD    Consults       Date and Time Order Name Status Description    2025  7:28 AM Inpatient Nephrology Consult      2025  1:47 AM Inpatient Neurology Consult Stroke      2025  9:42 PM Inpatient Hospitalist Consult              Active and Resolved Hospital Problems:  Active Hospital Problems    Diagnosis POA    **CVA (cerebral vascular accident) [I63.9] Yes    CKD (chronic kidney disease), stage III [N18.30] Unknown    Chronic congestive heart failure [I50.9] Unknown    Pleural effusion [J90] Unknown    Intractable headache [R51.9] Unknown    Essential hypertension [I10] Unknown    Hyperlipidemia [E78.5] Unknown    Type 2 diabetes mellitus with hyperglycemia [E11.65] Unknown      Resolved Hospital Problems   No resolved problems to display.       Hospital Course     Hospital Course:  74 y.o. male who presented with chief complaint of shortness of breath and headache.  He was also described by his daughter to have possible slurring of speech, and altered mental status, and leg weakness.  Last known normal was the night prior.  Patient's daughter spoke to him around 1:30 in the afternoon and reported that he seemed confused and had slurred speech.  He did not present to the emergency department until the evening.  He is on Eliquis for A-fib.  He was evaluated by teleneurology and also had stroke CT package that did not show acute intracranial pathology.     He was admitted for further evaluation management under the care of the hospitalist service.     Interval Followup:      Patient underwent CTA head and neck, MRI brain without contrast and CT perfusion with and without contrast. MRI was negative for acute abnormalities.  Was noted to have incidental large right  pleural effusion and neck CTA.  Patient oxygen weaned from 6 L nasal cannula with aggressive diuresis.  He developed acute kidney injury.  On inquiry, patient states he had a pleural effusion 6 weeks ago [facility]  He initially denied having a history of pulmonary nodule, but then stated he was found to have something on his lung and the [facility] was [facility] possible [facility].  Patient states he has been slow to schedule this. Dedicated CT chest performed which is notable for a 2.3 x 2.2 cm spiculated right upper lobe nodule and moderate to large right pleural effusion with associated right lower lobe consolidation and atelectasis.  Pulmonologist consulted.  Patient underwent thoracentesis with 1400 cc fluid removed of serous character.     02/18/25 patient oxygenation continues to improve.  He is on room air at rest in room.  He was able to pass walking desaturation test and does not require home oxygen. His hospitalization was complicated by suzette on CKD, for which he was evaluated by nephrologist, who recommended judicious diuresis and adequate blood pressure control. He will receive [facility]. He and his daughter have initially requested discharge to rehab, however they subsequently requested discharge to home with home health. He states that he will continue follow up for [facility] chronic [facility] problems with the [facility].           Day of Discharge     Vital Signs:  Temp:  [97.3 °F (36.3 °C)-99 °F (37.2 °C)] 98.2 °F (36.8 °C)  Heart Rate:  [82-99] 82  Resp:  [18] 18  BP: (113-144)/(71-91) 140/91        Discharge Details        Discharge Medications        New Medications        Instructions Start Date   aspirin 81 MG chewable tablet   81 mg, Oral, Daily   Start Date: February 19, 2025            Changes to Medications        Instructions Start Date   lisinopril 10 MG tablet  Commonly known as:  RIC NAIR  What changed:   medication strength  how much to take  when to take this   10 mg, Oral, Every 24 Hours Scheduled   Start Date: February 19, 2025     metoprolol tartrate 25 MG tablet  Commonly known as: LOPRESSOR  What changed:   medication strength  how much to take   25 mg, Oral, 2 Times Daily             Continue These Medications        Instructions Start Date   apixaban 5 MG tablet tablet  Commonly known as: ELIQUIS   5 mg, 2 Times Daily      atorvastatin 40 MG tablet  Commonly known as: LIPITOR   40 mg, Daily      bumetanide 1 MG tablet  Commonly known as: BUMEX   1 mg, 2 Times Daily      cholecalciferol 25 MCG (1000 UT) tablet  Commonly known as: VITAMIN D3   1,000 Units, Daily      finasteride 5 MG tablet  Commonly known as: PROSCAR   5 mg, Daily      insulin aspart prot-insulin aspart (70-30) 100 UNIT/ML injection  Commonly known as: novoLOG 70/30   16 Units, 2 Times Daily With Meals      latanoprost 0.005 % ophthalmic solution  Commonly known as: XALATAN   1 drop, Nightly      metFORMIN  MG 24 hr tablet  Commonly known as: GLUCOPHAGE-XR   1,000 mg, Every Evening      Mounjaro 10 MG/0.5ML solution pen-injector  Generic drug: Tirzepatide   10 mg, Weekly      mupirocin 2 % ointment  Commonly known as: BACTROBAN   1 Application, 3 Times Daily      potassium chloride 10 MEQ CR tablet  Commonly known as: KLOR-CON M10   10 mEq, Daily      tamsulosin 0.4 MG capsule 24 hr capsule  Commonly known as: FLOMAX   2 capsules, Every Night at Bedtime      trospium 20 MG tablet  Commonly known as: SANCTURA   20 mg, 2 Times Daily             Stop These Medications      baclofen 10 MG tablet  Commonly known as: LIORESAL     furosemide 40 MG tablet  Commonly known as: LASIX     hydrALAZINE 25 MG tablet  Commonly known as: APRESOLINE              No Known Allergies    Discharge Disposition:  [place] [facility] [place]    Diet:  Hospital:  Diet Order   Procedures    Diet: Cardiac, Fluid Restriction (240  mL/tray); Healthy Heart (2-3 Na+); 1500 mL/day; Fluid Consistency: Thin (IDDSI 0)       Discharge Activity:       CODE STATUS:  Code Status and Medical Interventions: CPR (Attempt to Resuscitate); Full Support   Ordered at: 02/12/25 2151     Code Status (Patient has no pulse and is not breathing):    CPR (Attempt to Resuscitate)     Medical Interventions (Patient has pulse or is breathing):    Full Support         No future appointments.    Additional Instructions for the Follow-ups that You Need to Schedule       Discharge Follow-up with Specialty: Pulmonology, and nephrology; 2 Weeks   As directed      Specialty: Pulmonology, and nephrology   Follow Up: 2 Weeks                Pertinent  and/or Most Recent Results     PROCEDURES:   Surgical Procedures Since Admission: Thoracentesis    LAB RESULTS:      Lab 02/15/25  0523 02/14/25 0421 02/12/25 1952   WBC 4.70 4.72 6.84   HEMOGLOBIN 12.2* 12.7* 14.8   HEMATOCRIT 37.0* 39.1 45.3   PLATELETS 95* 102* 130*   NEUTROS ABS 3.25 3.44 5.42   IMMATURE GRANS (ABS) 0.02 0.05 0.06*   LYMPHS ABS 0.61* 0.65* 0.71   MONOS ABS 0.81 0.56 0.63   EOS ABS 0.00 0.01 0.00   MCV 86.4 85.9 86.8   PROTIME  --   --  16.4*   APTT  --   --  34.1         Lab 02/18/25  0836 02/16/25  0643 02/15/25  0523 02/14/25 0421 02/13/25 0513 02/12/25 1952   SODIUM 138 134* 129* 134*  --  138   POTASSIUM 3.7 3.4* 3.5 3.5  --  4.0   CHLORIDE 102 99 96* 101  --  102   CO2 23.4 20.7* 20.7* 22.3  --  22.9   ANION GAP 12.6 14.3 12.3 10.7  --  13.1   BUN 40* 45* 37* 30*  --  15   CREATININE 2.36* 2.43* 2.38* 2.46*  --  1.95*   EGFR 27.8* 26.9* 27.6* 26.5*  --  35.0*   GLUCOSE 156* 166* 186* 153*  --  150*   CALCIUM 8.1* 7.8* 8.1* 8.5*  --  9.5   PHOSPHORUS 2.8  --   --   --   --   --    HEMOGLOBIN A1C  --   --   --   --  6.90*  --          Lab 02/18/25  0836 02/12/25 1952   TOTAL PROTEIN  --  6.6   ALBUMIN 2.7* 3.5   GLOBULIN  --  3.1   ALT (SGPT)  --  6   AST (SGOT)  --  16   BILIRUBIN  --  0.7   ALK PHOS   --  66         Lab 02/15/25  0523 02/12/25  1952   PROBNP 5,401.0*  --    PROTIME  --  16.4*   INR  --  1.27*         Lab 02/13/25  0513   CHOLESTEROL 172   LDL CHOL 106*   HDL CHOL 35*   TRIGLYCERIDES 179*         Lab 02/12/25 2032 02/12/25 1952   ABO TYPING O O   RH TYPING Negative Negative   ANTIBODY SCREEN  --  Negative         Lab 02/18/25  1058   PH, ARTERIAL 7.427   PCO2, ARTERIAL 36.3   PO2 ART 65.6*   O2 SATURATION ART 93.2*   HCO3 ART 23.9   BASE EXCESS ART -0.1     Brief Urine Lab Results  (Last result in the past 365 days)        Color   Clarity   Blood   Leuk Est   Nitrite   Protein   CREAT   Urine HCG        02/17/25 1354             64.1               Microbiology Results (last 10 days)       Procedure Component Value - Date/Time    Clostridioides difficile Toxin, PCR - Stool, Per Rectum [[id number]] Collected: 02/16/25 0111    Lab Status: Final result Specimen: Stool from Per Rectum Updated: 02/16/25 0208     Toxigenic C. difficile by PCR Negative     027 Toxin Presumptive Negative    Narrative:      The result indicates the absence of toxigenic C. difficile from stool specimen.     AFB Culture - Body Fluid, Pleural Cavity [[id number]] Collected: 02/15/25 1422    Lab Status: Preliminary result Specimen: Body Fluid from Pleural Cavity Updated: 02/16/25 0828     AFB Stain No acid fast bacilli seen on direct smear    Body Fluid Culture - Body Fluid, Pleural Cavity [[id number]] Collected: 02/15/25 1422    Lab Status: Preliminary result Specimen: Body Fluid from Pleural Cavity Updated: 02/18/25 0741     Body Fluid Culture No growth at 2 days     Gram Stain Few (2+) Red blood cells      Rare (1+) WBCs observed - predominantly eosinophils      No organisms seen            XR Shoulder 2+ View Right    Result Date: 2/18/2025  Impression: Evidence for some underlying degenerative change. Electronically Signed: Pardeep Campos MD  2/18/2025 1:11 PM EST  Workstation ID: [id number]    XR Chest 1 View    Result Date:  2/15/2025  Impression: Decreased size of now small right sided pleural effusion status post thoracentesis. No pneumothorax. Electronically Signed: Michel Johnson MD  2/15/2025 4:14 PM EST  Workstation ID: [id number]    CT Chest Without Contrast Diagnostic    Result Date: 2/14/2025  1.There is a 2.3 x 2.2 cm spiculated right upper lobe nodule. Malignancy not excluded. Follow-up with a PET/CT or potentially a follow-up chest CT in 3 months recommended. 2.Moderate to large right pleural effusion. 3.Right lower lobe consolidation may reflect atelectasis and/or pneumonia. There is some probable atelectasis in the right middle lobe as well. 4.Atherosclerotic disease and coronary artery disease. Electronically Signed: Pastor Ambrocio MD  2/14/2025 9:16 PM EST  Workstation ID: [id number]    MRI Brain Without Contrast    Result Date: 2/13/2025  Impression: 1.No acute intracranial abnormality. 2.Chronic microvascular ischemic changes. 3.Diffuse brain atrophy. Electronically Signed: Randall Hoang MD  2/13/2025 8:39 AM EST  Workstation ID: [id number]    CT Angiogram Head w AI Analysis of LVO    Result Date: 2/12/2025  Impression: No proximal large vessel occlusion or major stenosis of the anterior circulation. Diminutive appearing vertebrobasilar circulation with fetal-type bilateral PCAs, which appear grossly patent. The V4 segment of the right vertebral artery is diminutive and is not clearly visualized distally and in the basilar artery appears diminutive and does not appear convincingly opacified in its midportion, unclear whether this is secondary to diminutive vertebrobasilar circulation and/or multifocal severe stenosis/focal occlusions. Recommend correlation with patient's symptoms and consider MRI for further evaluation if clinically warranted. Large right pleural effusion. Right upper lobe pulmonary nodule measuring 2.1 cm. Although this may be infectious/inflammatory, malignancy is not excluded and dedicated chest CT  can be considered for further evaluation. Electronically Signed: Elian Mccauley  2/12/2025 9:14 PM EST  Workstation ID: [id number]    CT Angiogram Neck    Result Date: 2/12/2025  Impression: No proximal large vessel occlusion or major stenosis of the anterior circulation. Diminutive appearing vertebrobasilar circulation with fetal-type bilateral PCAs, which appear grossly patent. The V4 segment of the right vertebral artery is diminutive and is not clearly visualized distally and in the basilar artery appears diminutive and does not appear convincingly opacified in its midportion, unclear whether this is secondary to diminutive vertebrobasilar circulation and/or multifocal severe stenosis/focal occlusions. Recommend correlation with patient's symptoms and consider MRI for further evaluation if clinically warranted. Large right pleural effusion. Right upper lobe pulmonary nodule measuring 2.1 cm. Although this may be infectious/inflammatory, malignancy is not excluded and dedicated chest CT can be considered for further evaluation. Electronically Signed: Elian Mccauley  2/12/2025 9:14 PM EST  Workstation ID: [id number]    XR Chest 1 View    Result Date: 2/12/2025  Impression: Interstitial changes likely representing interstitial edema with small right pleural effusion Electronically Signed: Jesus Santiago  2/12/2025 8:35 PM EST  Workstation ID: [id number]    CT CEREBRAL PERFUSION WITH & WITHOUT CONTRAST    Result Date: 2/12/2025   1. Presumed artifactual elevation of Tmax calculations. Given limitations of examination, MRI might be useful to further assess. Electronically Signed: Noel Bob MD  2/12/2025 8:09 PM EST  Workstation ID: [id number]    CT Head Without Contrast Stroke Protocol    Result Date: 2/12/2025  Impression: No acute intracranial findings. Electronically Signed: Elian Mccauley  2/12/2025 7:40 PM EST  Workstation ID: [id number]              Results for orders placed during the hospital encounter of  02/12/25    Adult Transthoracic Echo Complete w/ Color, Spectral and Contrast if necessary per protocol    Interpretation Summary    Very technically difficult study with very limited views.    Appears ejection fraction is greater than 50%.  Inappropriate septal motion noted.  No other obvious wall motion abnormalities.    The left ventricular cavity is mild to moderately dilated.    Left ventricular diastolic function was indeterminate.    The left atrial cavity is mild to moderately dilated.    Valves are not well-visualized but no obvious significant valvular disease by Doppler.      Labs Pending at Discharge:  Pending Labs       Order Current Status    Anaerobic Culture - Pleural Fluid, Pleural Cavity In process    AFB Culture - Body Fluid, Pleural Cavity Preliminary result    Body Fluid Culture - Body Fluid, Pleural Cavity Preliminary result              Time spent on Discharge including face to face service:  <30 minutes    Electronically signed by Phill Shelton MD, 02/18/25, 4:02 PM EST.

## 2025-02-18 NOTE — PROGRESS NOTES
[facility]   Hospitalist Progress Note  Date: 2025  Patient Name: Avila Parrish  : 1950  MRN: [id number]  Date of admission: 2025  Room/Bed: 213/1      Subjective   Subjective     Chief Complaint:   Chief Complaint   Patient presents with    Headache    Altered Mental Status       Summary:     74 y.o. male who presented with chief complaint of shortness of breath and headache.  He was also described by his daughter to have possible slurring of speech, and altered mental status, and leg weakness.  Last known normal was the night prior.  Patient's daughter spoke to him around 1:30 in the afternoon and reported that he seemed confused and had slurred speech.  He did not present to the emergency department until the evening.  He is on Eliquis for A-fib.  He was evaluated by teleneurology and also had stroke CT package that did not show acute intracranial pathology.     He was admitted for further evaluation management under the care of the hospitalist service.    Interval Followup:     Patient underwent CTA head and neck, MRI brain without contrast and CT perfusion with and without contrast. MRI was negative for acute abnormalities.  Was noted to have incidental large right pleural effusion and neck CTA.  Patient oxygen weaned from 6 L nasal cannula with aggressive diuresis.  He developed acute kidney injury.  On inquiry, patient states he had a pleural effusion 6 weeks ago [facility].  He initially denied having a history of pulmonary nodule, but then stated he was found to have something on his lung and the [facility] was [facility] possible [facility].  Patient states he has been slow to schedule this. Dedicated CT chest performed which is notable for a 2.3 x 2.2 cm spiculated right upper lobe nodule and moderate to large right pleural effusion with associated right lower lobe consolidation and atelectasis.  Pulmonologist consulted.  Patient  underwent thoracentesis with 1400 cc fluid removed of serous character.    02/18/25 patient oxygenation continues to improve.  He is on room air at rest in room.  He was able to pass walking desaturation test and does not require home oxygen.  He will receive [facility].  I discussed his impending discharge with his daughter, who then requested rehab placement.    Objective   Objective     Vitals:   Temp:  [97.3 °F (36.3 °C)-99 °F (37.2 °C)] 98.2 °F (36.8 °C)  Heart Rate:  [81-99] 82  Resp:  [18] 18  BP: (113-144)/(71-91) 140/91    Physical Exam   General: Awake, alert, in no acute distress.   HENT: Atraumatic, normocephalic.  Patient on room air  Eyes: pupils equal, round, without scleral icterus  Cardiovascular: Regular rate and rhythm, no murmurs   Pulmonary: CTA bilaterally, no conversational dyspnea  Gastrointestinal: Soft nontender nondistended  Musculoskeletal: Edema significantly improved    Result Review    Result Review:  I have personally reviewed these results:  [x]  Laboratory      Lab 02/15/25  0523 02/14/25 0421 02/12/25 1952   WBC 4.70 4.72 6.84   HEMOGLOBIN 12.2* 12.7* 14.8   HEMATOCRIT 37.0* 39.1 45.3   PLATELETS 95* 102* 130*   NEUTROS ABS 3.25 3.44 5.42   IMMATURE GRANS (ABS) 0.02 0.05 0.06*   LYMPHS ABS 0.61* 0.65* 0.71   MONOS ABS 0.81 0.56 0.63   EOS ABS 0.00 0.01 0.00   MCV 86.4 85.9 86.8   PROTIME  --   --  16.4*   APTT  --   --  34.1         Lab 02/18/25  0836 02/16/25  0643 02/15/25  0523 02/14/25 0421 02/13/25  0513   SODIUM 138 134* 129*   < >  --    POTASSIUM 3.7 3.4* 3.5   < >  --    CHLORIDE 102 99 96*   < >  --    CO2 23.4 20.7* 20.7*   < >  --    ANION GAP 12.6 14.3 12.3   < >  --    BUN 40* 45* 37*   < >  --    CREATININE 2.36* 2.43* 2.38*   < >  --    EGFR 27.8* 26.9* 27.6*   < >  --    GLUCOSE 156* 166* 186*   < >  --    CALCIUM 8.1* 7.8* 8.1*   < >  --    PHOSPHORUS 2.8  --   --   --   --    HEMOGLOBIN A1C  --   --   --    --  6.90*    < > = values in this interval not displayed.         Lab 02/18/25  0836 02/12/25 1952   TOTAL PROTEIN  --  6.6   ALBUMIN 2.7* 3.5   GLOBULIN  --  3.1   ALT (SGPT)  --  6   AST (SGOT)  --  16   BILIRUBIN  --  0.7   ALK PHOS  --  66         Lab 02/15/25  0523 02/12/25  1952   PROBNP 5,401.0*  --    PROTIME  --  16.4*   INR  --  1.27*         Lab 02/13/25  0513   CHOLESTEROL 172   LDL CHOL 106*   HDL CHOL 35*   TRIGLYCERIDES 179*         Lab 02/12/25 2032 02/12/25 1952   ABO TYPING O O   RH TYPING Negative Negative   ANTIBODY SCREEN  --  Negative         Lab 02/18/25  1058   PH, ARTERIAL 7.427   PCO2, ARTERIAL 36.3   PO2 ART 65.6*   O2 SATURATION ART 93.2*   HCO3 ART 23.9   BASE EXCESS ART -0.1     Brief Urine Lab Results  (Last result in the past 365 days)        Color   Clarity   Blood   Leuk Est   Nitrite   Protein   CREAT   Urine HCG        02/17/25 1354             64.1               [x]  Microbiology   Microbiology Results (last 10 days)       Procedure Component Value - Date/Time    Clostridioides difficile Toxin, PCR - Stool, Per Rectum [[id number]] Collected: 02/16/25 0111    Lab Status: Final result Specimen: Stool from Per Rectum Updated: 02/16/25 0208     Toxigenic C. difficile by PCR Negative     027 Toxin Presumptive Negative    Narrative:      The result indicates the absence of toxigenic C. difficile from stool specimen.     AFB Culture - Body Fluid, Pleural Cavity [[id number]] Collected: 02/15/25 1422    Lab Status: Preliminary result Specimen: Body Fluid from Pleural Cavity Updated: 02/16/25 0828     AFB Stain No acid fast bacilli seen on direct smear    Body Fluid Culture - Body Fluid, Pleural Cavity [[id number]] Collected: 02/15/25 1422    Lab Status: Preliminary result Specimen: Body Fluid from Pleural Cavity Updated: 02/18/25 0741     Body Fluid Culture No growth at 2 days     Gram Stain Few (2+) Red blood cells      Rare (1+) WBCs observed - predominantly eosinophils      No  organisms seen          [x]  Radiology  XR Shoulder 2+ View Right    Result Date: 2/18/2025  Impression: Evidence for some underlying degenerative change. Electronically Signed: Pardeep Campos MD  2/18/2025 1:11 PM EST  Workstation ID: [id number]    XR Chest 1 View    Result Date: 2/15/2025  Impression: Decreased size of now small right sided pleural effusion status post thoracentesis. No pneumothorax. Electronically Signed: Michel Johnson MD  2/15/2025 4:14 PM EST  Workstation ID: [id number]    CT Chest Without Contrast Diagnostic    Result Date: 2/14/2025  1.There is a 2.3 x 2.2 cm spiculated right upper lobe nodule. Malignancy not excluded. Follow-up with a PET/CT or potentially a follow-up chest CT in 3 months recommended. 2.Moderate to large right pleural effusion. 3.Right lower lobe consolidation may reflect atelectasis and/or pneumonia. There is some probable atelectasis in the right middle lobe as well. 4.Atherosclerotic disease and coronary artery disease. Electronically Signed: Pastor Ambrocio MD  2/14/2025 9:16 PM EST  Workstation ID: [id number]    MRI Brain Without Contrast    Result Date: 2/13/2025  Impression: 1.No acute intracranial abnormality. 2.Chronic microvascular ischemic changes. 3.Diffuse brain atrophy. Electronically Signed: Randall Hoang MD  2/13/2025 8:39 AM EST  Workstation ID: [id number]    CT Angiogram Head w AI Analysis of LVO    Result Date: 2/12/2025  Impression: No proximal large vessel occlusion or major stenosis of the anterior circulation. Diminutive appearing vertebrobasilar circulation with fetal-type bilateral PCAs, which appear grossly patent. The V4 segment of the right vertebral artery is diminutive and is not clearly visualized distally and in the basilar artery appears diminutive and does not appear convincingly opacified in its midportion, unclear whether this is secondary to diminutive vertebrobasilar circulation and/or multifocal severe stenosis/focal occlusions. Recommend  correlation with patient's symptoms and consider MRI for further evaluation if clinically warranted. Large right pleural effusion. Right upper lobe pulmonary nodule measuring 2.1 cm. Although this may be infectious/inflammatory, malignancy is not excluded and dedicated chest CT can be considered for further evaluation. Electronically Signed: Elian Mccauley  2/12/2025 9:14 PM EST  Workstation ID: [id number]    CT Angiogram Neck    Result Date: 2/12/2025  Impression: No proximal large vessel occlusion or major stenosis of the anterior circulation. Diminutive appearing vertebrobasilar circulation with fetal-type bilateral PCAs, which appear grossly patent. The V4 segment of the right vertebral artery is diminutive and is not clearly visualized distally and in the basilar artery appears diminutive and does not appear convincingly opacified in its midportion, unclear whether this is secondary to diminutive vertebrobasilar circulation and/or multifocal severe stenosis/focal occlusions. Recommend correlation with patient's symptoms and consider MRI for further evaluation if clinically warranted. Large right pleural effusion. Right upper lobe pulmonary nodule measuring 2.1 cm. Although this may be infectious/inflammatory, malignancy is not excluded and dedicated chest CT can be considered for further evaluation. Electronically Signed: Elian Mccauley  2/12/2025 9:14 PM EST  Workstation ID: [id number]    XR Chest 1 View    Result Date: 2/12/2025  Impression: Interstitial changes likely representing interstitial edema with small right pleural effusion Electronically Signed: Jesus Santiago  2/12/2025 8:35 PM EST  Workstation ID: [id number]    CT CEREBRAL PERFUSION WITH & WITHOUT CONTRAST    Result Date: 2/12/2025   1. Presumed artifactual elevation of Tmax calculations. Given limitations of examination, MRI might be useful to further assess. Electronically Signed: Noel Bob MD  2/12/2025 8:09 PM EST  Workstation ID:  [id number]    CT Head Without Contrast Stroke Protocol    Result Date: 2/12/2025  Impression: No acute intracranial findings. Electronically Signed: Elian Mccauley  2/12/2025 7:40 PM EST  Workstation ID: [id number]   []  EKG/Telemetry   []  Cardiology/Vascular   []  Pathology  []  Old records  []  Other:    Assessment & Plan   Assessment / Plan     Assessment     TIA versus stroke    Large pleural effusion    Spiculated lung nodule    Intractable headache    Essential hypertension    Hyperlipidemia    Type 2 diabetes mellitus with hyperglycemia    Acute Chf exacerbation     Plan    -For pulmonary effusion, now status post thoracentesis with improvement in respiratory status.  On room air.   -Acute hypoxic respiratory failure in setting of above improving, continue p.o. diuresis, fluid and salt restrictions  -For MARILY, CKD, nephrology consulted.  Appreciate recommendations.  Judicious diuresis.  Outpatient follow-up  -Continue strict ins and outs, daily weights  -Continue CHF protocol  -For possible CVA, possible TIA versus subacute infarct per neurology.  Likely altered in setting of hypoxia.  Neuro recommendations reviewed and appreciated.    -MRI and CTA head and neck negative.    -Will recommend Holter/loop recorder outpatient on DC.  Continue statin, aspirin  -Maintain euglycemia. 10 units detemir at night   -Holding hydralazine, lisinopril in setting of soft blood pressure      Disposition: Needs rehab placement       VTE Prophylaxis:  Pharmacologic & mechanical VTE prophylaxis orders are present.        CODE STATUS:   Code Status (Patient has no pulse and is not breathing): CPR (Attempt to Resuscitate)  Medical Interventions (Patient has pulse or is breathing): Full Support      Electronically signed by Phill Shelton MD, 2/18/2025, 13:29 EST.

## 2025-02-19 LAB
BACTERIA FLD CULT: NORMAL
GRAM STN SPEC: NORMAL

## 2025-02-19 NOTE — OUTREACH NOTE
Prep Survey      Flowsheet Row Responses   [facility] patient discharged from? You   Is LACE score < 7 ? No   Eligibility Readm Mgmt   Discharge diagnosis CVA (cerebral vascular accident)   Does the patient have one of the following disease processes/diagnoses(primary or secondary)? Stroke   Does the patient have Home health ordered? Yes   What is the Home health agency?  [facility]   Prep survey completed? Yes            Dorothy WARREN - Registered Nurse

## 2025-02-20 LAB
QT INTERVAL: 357 MS
QTC INTERVAL: 497 MS

## 2025-02-21 LAB — BACTERIA SPEC ANAEROBE CULT: NORMAL

## 2025-02-22 LAB
MYCOBACTERIUM SPEC CULT: NORMAL
NIGHT BLUE STAIN TISS: NORMAL

## 2025-02-25 ENCOUNTER — READMISSION MANAGEMENT (OUTPATIENT)
Dept: CALL CENTER | Facility: HOSPITAL | Age: 75
End: 2025-02-25
Payer: OTHER GOVERNMENT

## 2025-02-25 NOTE — OUTREACH NOTE
Stroke Week 1 Survey      Flowsheet Row Responses   [facility] facility patient discharged from? You   Does the patient have one of the following disease processes/diagnoses(primary or secondary)? Stroke   Week 1 attempt successful? No   Unsuccessful attempts Attempt 2   Discharge diagnosis CVA (cerebral vascular accident)            MOJGAN CÁRDENAS - Registered Nurse

## 2025-03-01 LAB
MYCOBACTERIUM SPEC CULT: NORMAL
NIGHT BLUE STAIN TISS: NORMAL

## 2025-03-08 LAB
MYCOBACTERIUM SPEC CULT: NORMAL
NIGHT BLUE STAIN TISS: NORMAL

## 2025-03-12 ENCOUNTER — READMISSION MANAGEMENT (OUTPATIENT)
Dept: CALL CENTER | Facility: HOSPITAL | Age: 75
End: 2025-03-12
Payer: OTHER GOVERNMENT

## 2025-03-12 NOTE — OUTREACH NOTE
Stroke Week 3 Survey      Flowsheet Row Responses   [facility] facility patient discharged from? You   Does the patient have one of the following disease processes/diagnoses(primary or secondary)? Stroke   Week 3 attempt successful? Yes   Call start time 1235   Revoke Readmitted  [[facility] a [facility] per daughter.]   Call end time 1239            TORY AVILES - Registered Nurse

## 2025-03-15 LAB
MYCOBACTERIUM SPEC CULT: NORMAL
NIGHT BLUE STAIN TISS: NORMAL

## 2025-03-22 LAB
MYCOBACTERIUM SPEC CULT: NORMAL
NIGHT BLUE STAIN TISS: NORMAL

## 2025-03-29 LAB
MYCOBACTERIUM SPEC CULT: NORMAL
NIGHT BLUE STAIN TISS: NORMAL

## (undated) DEVICE — Device: Brand: DEFENDO AIR/WATER/SUCTION AND BIOPSY VALVE

## (undated) DEVICE — SNAR POLYP CAPTIFLEX XS/OVL 11X2.4MM 240CM 1P/U

## (undated) DEVICE — SOL IRRG H2O PL/BG 1000ML STRL

## (undated) DEVICE — COLON KIT: Brand: MEDLINE INDUSTRIES, INC.

## (undated) DEVICE — THE SINGLE USE ETRAP – POLYP TRAP IS USED FOR SUCTION RETRIEVAL OF ENDOSCOPICALLY REMOVED POLYPS.: Brand: ETRAP